# Patient Record
Sex: FEMALE | Race: WHITE | NOT HISPANIC OR LATINO | Employment: UNEMPLOYED | URBAN - METROPOLITAN AREA
[De-identification: names, ages, dates, MRNs, and addresses within clinical notes are randomized per-mention and may not be internally consistent; named-entity substitution may affect disease eponyms.]

---

## 2017-07-19 ENCOUNTER — DOCUMENTATION (OUTPATIENT)
Dept: BARIATRICS/WEIGHT MGMT | Facility: CLINIC | Age: 37
End: 2017-07-19

## 2017-07-19 DIAGNOSIS — R53.83 FATIGUE, UNSPECIFIED TYPE: ICD-10-CM

## 2017-07-19 DIAGNOSIS — R06.00 DYSPNEA, UNSPECIFIED TYPE: Primary | ICD-10-CM

## 2017-07-19 DIAGNOSIS — R10.13 DYSPEPSIA: ICD-10-CM

## 2017-07-19 RX ORDER — TRAZODONE HYDROCHLORIDE 50 MG/1
50 TABLET ORAL NIGHTLY
COMMUNITY
End: 2021-09-24

## 2017-07-19 RX ORDER — DULOXETIN HYDROCHLORIDE 30 MG/1
90 CAPSULE, DELAYED RELEASE ORAL DAILY
COMMUNITY
End: 2021-09-24

## 2017-07-19 RX ORDER — ZOLPIDEM TARTRATE 10 MG/1
10 TABLET ORAL NIGHTLY
COMMUNITY

## 2017-07-19 RX ORDER — ESCITALOPRAM OXALATE 20 MG/1
20 TABLET ORAL DAILY
COMMUNITY

## 2017-07-19 RX ORDER — GABAPENTIN 600 MG/1
600 TABLET ORAL 3 TIMES DAILY
COMMUNITY

## 2017-07-19 RX ORDER — OMEPRAZOLE 20 MG/1
20 CAPSULE, DELAYED RELEASE ORAL DAILY
COMMUNITY
End: 2017-12-18

## 2017-07-19 RX ORDER — OLANZAPINE 7.5 MG/1
7.5 TABLET ORAL NIGHTLY
COMMUNITY

## 2017-08-22 DIAGNOSIS — R06.00 DYSPNEA, UNSPECIFIED TYPE: ICD-10-CM

## 2017-08-22 DIAGNOSIS — R10.13 DYSPEPSIA: ICD-10-CM

## 2017-08-22 DIAGNOSIS — R53.83 FATIGUE, UNSPECIFIED TYPE: ICD-10-CM

## 2017-08-23 ENCOUNTER — OFFICE VISIT (OUTPATIENT)
Dept: BARIATRICS/WEIGHT MGMT | Facility: CLINIC | Age: 37
End: 2017-08-23

## 2017-08-23 VITALS
BODY MASS INDEX: 43.07 KG/M2 | DIASTOLIC BLOOD PRESSURE: 92 MMHG | SYSTOLIC BLOOD PRESSURE: 133 MMHG | WEIGHT: 258.5 LBS | TEMPERATURE: 97.8 F | HEART RATE: 82 BPM | RESPIRATION RATE: 18 BRPM | OXYGEN SATURATION: 99 % | HEIGHT: 65 IN

## 2017-08-23 DIAGNOSIS — K21.9 GASTROESOPHAGEAL REFLUX DISEASE, ESOPHAGITIS PRESENCE NOT SPECIFIED: ICD-10-CM

## 2017-08-23 DIAGNOSIS — M54.9 BACK PAIN, UNSPECIFIED BACK LOCATION, UNSPECIFIED BACK PAIN LATERALITY, UNSPECIFIED CHRONICITY: ICD-10-CM

## 2017-08-23 DIAGNOSIS — R53.83 FATIGUE, UNSPECIFIED TYPE: ICD-10-CM

## 2017-08-23 DIAGNOSIS — E66.01 OBESITY, CLASS III, BMI 40-49.9 (MORBID OBESITY) (HCC): Primary | ICD-10-CM

## 2017-08-23 PROCEDURE — 99406 BEHAV CHNG SMOKING 3-10 MIN: CPT | Performed by: SURGERY

## 2017-08-23 PROCEDURE — 99205 OFFICE O/P NEW HI 60 MIN: CPT | Performed by: SURGERY

## 2017-08-23 NOTE — PROGRESS NOTES
"[facility] BARIATRIC SURGERY  [address]  [facility] [address]  [phone]      Patient  Name:  Hao Platt.  :  1980      Date of Visit: 2017      Chief Complaint:  weight gain; unable to maintain weight loss    History of Present Illness:  Hao Platt is a 37 y.o. female who presents today for evaluation, education and consultation regarding weight loss surgery. The patient is interested in sleeve gastrectomy     Hao has been overweight for at least 7 years, has been 35 pounds or more overweight for at least 7 years, has been 100 pounds or more overweight for 1 or more years and started dieting at age 30.  Hao describes her eating habits as emotional eater, skipping meals, eating sweets at night.     Previous diet attempts include: [facility], High Protein, Low Carbohydrate and Calorie Counting; Nutri-System, Overeaters Anonymous, Diet Center and Weight Watchers; None.  The most weight Hao lost was 20 pounds on medication but was only able to maintain that weight loss for 1 yr.  Her maximum lifetime weight is 257 pounds.    She has watched the online seminar.  Her friend who is a nurse, named Hao, had a sleeve with Dr. Cooley and is doing great.      She gained a lot of weight with her last pregnancy.  Her baby is 6 months old.    She attributes weight gain also to her psych meds, particularly Zyprexa.        Past Medical History:   Diagnosis Date   • Anxiety    • Asthma     as a child   • Back pain    • Bipolar disorder    • Depression    • Fatigue    • GERD (gastroesophageal reflux disease)     feels as epigastric pain/chest pain if not taking PPI   • Hiatal hernia     on PPI.  Quit PPI, and had major epigastric pain, so back on PPI   • Hip pain     left hip \"locks up\" sometimes   • Insomnia    •  (normal spontaneous vaginal delivery)     x 3, no issues   • Peripheral edema    • Rectal bleeding     had C-scope.  from hemorrhoids   • " "Snoring     never tested for sleep apnea but thinks she might have it     Past Surgical History:   Procedure Laterality Date   • COLONOSCOPY  2015    \"conscious sedation does not work on me\" She thinks they just used Versed.  first attempt unable to complete.  Done for abdominal pain and constipation.   • ENDOSCOPY  2010    [facility]   • LAPAROSCOPIC APPENDECTOMY  2013   • TUBAL ABDOMINAL LIGATION  2017       Allergies   Allergen Reactions   • Nuts Swelling     Tree nuts         Current Outpatient Prescriptions:   •  DULoxetine (CYMBALTA) 30 MG capsule, Take 30 mg by mouth Daily., Disp: , Rfl:   •  escitalopram (LEXAPRO) 20 MG tablet, Take 20 mg by mouth Daily., Disp: , Rfl:   •  gabapentin (NEURONTIN) 600 MG tablet, Take 600 mg by mouth 3 (Three) Times a Day., Disp: , Rfl:   •  naltrexone-bupropion ER (CONTRAVE) 8-90 MG tablet, Take 1 tablet by mouth 2 (Two) Times a Day., Disp: , Rfl:   •  OLANZapine (zyPREXA) 7.5 MG tablet, Take 7.5 mg by mouth Every Night., Disp: , Rfl:   •  omeprazole (priLOSEC) 20 MG capsule, Take 20 mg by mouth Daily., Disp: , Rfl:   •  traZODone (DESYREL) 50 MG tablet, Take 50 mg by mouth Every Night., Disp: , Rfl:   •  zolpidem (AMBIEN) 10 MG tablet, Take 10 mg by mouth At Night As Needed for Sleep., Disp: , Rfl:     Social History     Social History   • Marital status:      Spouse name: Hector Platt   • Number of children: 3   • Years of education: College     Occupational History   • Homemaker      Social History Main Topics   • Smoking status: Former Smoker     Years: 25.00     Types: Cigarettes     Quit date: 2016   • Smokeless tobacco: Never Used      Comment: Is not around secondhand smoke in the house or car   • Alcohol use Yes      Comment: infrequent glass of wine   • Drug use: No   • Sexual activity: Not on file     Other Topics Concern   • Not on file     Social History Narrative    Homemaker.  Lives with  and three children.      Family History "   Problem Relation Age of Onset   • Hypertension Mother    • Hypertension Maternal Grandmother    • Hypertension Maternal Grandfather    • Hypertension Paternal Grandmother          Review of Systems:  Constitutional:  The patient reports fatigue, weight gain and denies fevers and chills.  Cardiovascular:  The patient reports edema and denies HTN, HLD, CP, MI, heart disease and DVT.  Respiratory:  The patient reports asthma and denies apnea and PE.  Gastrointestinal:  The patient reports heartburn and denies pancreatitis, liver disease and IBS.  Genitourinary:  The patient denies renal insufficiency.    Musculoskeletal:  The patient reports joint pain and denies fibromyalgia, arthritis and autoimmune disease.  Neurological:  The patient reports none and denies seizure and stroke.  Psychiatric:  The patient reports anxiety, depression, bipolar disorder and denies schizophrenia.  Endocrine:  The patient reports none and denies diabetes, thyroid disease and gout.  Hematologic:  The patient reports none and denies anemia and bleeding disorder.  Skin:  The patient denies MRSA.    Physical Exam:  Vital Signs:  Weight: 258 lb 8 oz (117 kg)   Body mass index is 43.02 kg/(m^2).  Temp: 97.8 °F (36.6 °C)   Heart Rate: 82   BP: 133/92     Physical Exam   Constitutional: She is oriented to person, place, and time. She appears well-developed and well-nourished. No distress.   HENT:   Head: Normocephalic and atraumatic.   Mouth/Throat: No oropharyngeal exudate.   Eyes: EOM are normal. Pupils are equal, round, and reactive to light. No scleral icterus.   Neck: Normal range of motion. Neck supple. No thyromegaly present.   Cardiovascular: Normal rate, regular rhythm and normal heart sounds.    No murmur heard.  Pulmonary/Chest: Effort normal. No respiratory distress. She has no wheezes.   Abdominal: Soft. She exhibits no distension and no mass. There is no tenderness. No hernia.       Musculoskeletal: Normal range of motion. She  exhibits no edema or deformity.   Neurological: She is alert and oriented to person, place, and time. No cranial nerve deficit.   Skin: Skin is warm and dry. No rash noted. No erythema.   Psychiatric: She has a normal mood and affect. Her behavior is normal. Judgment and thought content normal.        There is no problem list on file for this patient.      Assessment:    Hao Platt is a 37 y.o. year old female with medically complicated obesity pursuing sleeve gastrectomy.    Weight loss surgery is deemed medically necessary given the following obesity related comorbidities including GERD, edema, depression and mental health disease with current Weight: 258 lb 8 oz (117 kg) and Body mass index is 43.02 kg/(m^2)..    Plan:  The consultation plan and program requirements were reviewed with the patient.  The patient has been advised that a letter of medical support must be obtained from her primary care physician or referring provider. A psychological evaluation will be arranged.  A nutritional evaluation will be performed.  The patient was advised to start a high protein and low carbohydrate diet.  Necessary lifestyle modifications were discussed.  Instructions on how to access [facility] was given to the patient.  [facility] is an internet based educational video that explains the surgical procedure chosen and answers basic questions regarding that procedure.     Preoperative testing will include: CBC, CMP, Fasting Lipids, TSH, H.Pylori, Pulmonary Function Testing, CXR, EKG and EGD     Additional preop clearances required prior to surgery: None.      Patient understands that bariatric surgery is not cosmetic surgery but rather a tool to help make a lifelong commitment to lifestyle changes including diet, exercise, behavior modifications, and healthy habits.    I spent 3-10 minutes discussion smoking cessation and/or avoidance of second-hand smoke.      I spent 60 minutes with the patient and over half the time was spent  counseling.        Rita Barnhart MD

## 2017-08-24 ENCOUNTER — DOCUMENTATION (OUTPATIENT)
Dept: BARIATRICS/WEIGHT MGMT | Facility: CLINIC | Age: 37
End: 2017-08-24

## 2017-08-24 NOTE — PROGRESS NOTES
"Weight Loss Surgery  Presurgical Nutrition Assessment     Hao Platt  2017  [id number]  [id number]  1980  female    Surgery desired: Sleeve Gastrectomy    Ht 65\" (165.1 cm); Wt 258.5 # (117  Kg); BMI 43  Past Medical History:   Diagnosis Date   • Anxiety    • Asthma     as a child   • Back pain    • Bipolar disorder    • Depression    • Fatigue    • GERD (gastroesophageal reflux disease)     feels as epigastric pain/chest pain if not taking PPI   • Hiatal hernia     on PPI.  Quit PPI, and had major epigastric pain, so back on PPI   • Hip pain     left hip \"locks up\" sometimes   • Insomnia    •  (normal spontaneous vaginal delivery)     x 3, no issues   • Peripheral edema    • Rectal bleeding     had C-scope.  from hemorrhoids   • Snoring     never tested for sleep apnea but thinks she might have it     Past Surgical History:   Procedure Laterality Date   • COLONOSCOPY      \"conscious sedation does not work on me\" She thinks they just used Versed.  first attempt unable to complete.  Done for abdominal pain and constipation.   • ENDOSCOPY      [facility]   • LAPAROSCOPIC APPENDECTOMY     • TUBAL ABDOMINAL LIGATION       Allergies   Allergen Reactions   • Nuts Swelling     Tree nuts       Current Outpatient Prescriptions:   •  DULoxetine (CYMBALTA) 30 MG capsule, Take 30 mg by mouth Daily., Disp: , Rfl:   •  escitalopram (LEXAPRO) 20 MG tablet, Take 20 mg by mouth Daily., Disp: , Rfl:   •  gabapentin (NEURONTIN) 600 MG tablet, Take 600 mg by mouth 3 (Three) Times a Day., Disp: , Rfl:   •  naltrexone-bupropion ER (CONTRAVE) 8-90 MG tablet, Take 1 tablet by mouth 2 (Two) Times a Day., Disp: , Rfl:   •  OLANZapine (zyPREXA) 7.5 MG tablet, Take 7.5 mg by mouth Every Night., Disp: , Rfl:   •  omeprazole (priLOSEC) 20 MG capsule, Take 20 mg by mouth Daily., Disp: , Rfl:   •  traZODone (DESYREL) 50 MG tablet, Take 50 mg by mouth Every Night., Disp: , Rfl:   •  zolpidem (AMBIEN) 10 MG " tablet, Take 10 mg by mouth At Night As Needed for Sleep., Disp: , Rfl:       Nutrition Assessment    Estimated energy needs:  1850 kcal    Estimated calories for weight loss:  1350 kcal    IBW (Pounds):  138 #        Excess body weight (Pounds):  120#       Nutrition Recall  24 Hour recall: (B) (L) (D) -  Reviewed and discussed with patient.  Ot ate only 1 balanced meal the day she kept food record.  Prepares a fruit & veggie smoothie that is enough for 2 servings each day.  No fried foods, but diet included side of 1/2 cup mac & cheese; 2 slices peanut butter pie for dessert.                                                                               Exercise  daily - walks 45 min qd  But back & feet hurt when she does this       Education    Provided manual:  Sleeve Gastrectomy; .    Recommend that team proceed with surgery and follow per protocol.      Nutrition Goals   Dietary Guidelines per info packet, as described above.    Protein goal:  grams per day   -140 grams qd      Exercise Goals  Continue current exercise routine   Add 15-30 minutes of activity per day as tolerated      Rachelle Josue RD  08/24/2017  2:24 PM

## 2017-08-28 LAB
ALBUMIN SERPL-MCNC: 4.4 G/DL (ref 3.2–4.8)
ALBUMIN/GLOB SERPL: 1.6 G/DL (ref 1.5–2.5)
ALP SERPL-CCNC: 101 U/L (ref 25–100)
ALT SERPL-CCNC: 25 U/L (ref 7–40)
AST SERPL-CCNC: 24 U/L (ref 0–33)
BILIRUB SERPL-MCNC: 0.4 MG/DL (ref 0.3–1.2)
BUN SERPL-MCNC: 11 MG/DL (ref 9–23)
BUN/CREAT SERPL: 12.2 (ref 7–25)
CALCIUM SERPL-MCNC: 9.6 MG/DL (ref 8.7–10.4)
CHLORIDE SERPL-SCNC: 103 MMOL/L (ref 99–109)
CHOLEST SERPL-MCNC: 219 MG/DL (ref 0–200)
CO2 SERPL-SCNC: 26 MMOL/L (ref 20–31)
CREAT SERPL-MCNC: 0.9 MG/DL (ref 0.6–1.3)
ERYTHROCYTE [DISTWIDTH] IN BLOOD BY AUTOMATED COUNT: 13.3 % (ref 11.3–14.5)
GLOBULIN SER CALC-MCNC: 2.8 GM/DL
GLUCOSE SERPL-MCNC: 114 MG/DL (ref 70–100)
H PYLORI IGA SER-ACNC: <9 UNITS (ref 0–8.9)
H PYLORI IGG SER IA-ACNC: <0.9 U/ML (ref 0–0.8)
H PYLORI IGM SER-ACNC: <9 UNITS (ref 0–8.9)
HCT VFR BLD AUTO: 40.8 % (ref 34.5–44)
HDLC SERPL-MCNC: 41 MG/DL (ref 40–60)
HGB BLD-MCNC: 13.2 G/DL (ref 11.5–15.5)
LDLC SERPL CALC-MCNC: 153 MG/DL (ref 0–100)
MCH RBC QN AUTO: 27.5 PG (ref 27–31)
MCHC RBC AUTO-ENTMCNC: 32.4 G/DL (ref 32–36)
MCV RBC AUTO: 85 FL (ref 80–99)
PLATELET # BLD AUTO: 103 10*3/MM3 (ref 150–450)
POTASSIUM SERPL-SCNC: 4.5 MMOL/L (ref 3.5–5.5)
PROT SERPL-MCNC: 7.2 G/DL (ref 5.7–8.2)
RBC # BLD AUTO: 4.8 10*6/MM3 (ref 3.89–5.14)
SODIUM SERPL-SCNC: 139 MMOL/L (ref 132–146)
TRIGL SERPL-MCNC: 124 MG/DL (ref 0–150)
TSH SERPL DL<=0.005 MIU/L-ACNC: 2.53 MIU/ML (ref 0.35–5.35)
VLDLC SERPL CALC-MCNC: 24.8 MG/DL
WBC # BLD AUTO: 7.31 10*3/MM3 (ref 3.5–10.8)

## 2017-09-10 ENCOUNTER — APPOINTMENT (OUTPATIENT)
Dept: PREADMISSION TESTING | Facility: HOSPITAL | Age: 37
End: 2017-09-10

## 2017-09-10 LAB
HCT VFR BLD AUTO: 38.8 % (ref 34.5–44)
POTASSIUM BLD-SCNC: 3.9 MMOL/L (ref 3.5–5.5)

## 2017-09-10 PROCEDURE — 84132 ASSAY OF SERUM POTASSIUM: CPT | Performed by: SURGERY

## 2017-09-10 PROCEDURE — 36415 COLL VENOUS BLD VENIPUNCTURE: CPT

## 2017-09-10 PROCEDURE — 85014 HEMATOCRIT: CPT | Performed by: SURGERY

## 2017-09-11 ENCOUNTER — LAB REQUISITION (OUTPATIENT)
Dept: LAB | Facility: HOSPITAL | Age: 37
End: 2017-09-11

## 2017-09-11 ENCOUNTER — OUTSIDE FACILITY SERVICE (OUTPATIENT)
Dept: BARIATRICS/WEIGHT MGMT | Facility: CLINIC | Age: 37
End: 2017-09-11

## 2017-09-11 DIAGNOSIS — K21.9 GASTRO-ESOPHAGEAL REFLUX DISEASE WITHOUT ESOPHAGITIS: ICD-10-CM

## 2017-09-11 PROCEDURE — 43239 EGD BIOPSY SINGLE/MULTIPLE: CPT | Performed by: SURGERY

## 2017-09-11 PROCEDURE — 88305 TISSUE EXAM BY PATHOLOGIST: CPT | Performed by: SURGERY

## 2017-09-12 LAB
CYTO UR: NORMAL
LAB AP CASE REPORT: NORMAL
LAB AP CLINICAL INFORMATION: NORMAL
Lab: NORMAL
PATH REPORT.FINAL DX SPEC: NORMAL
PATH REPORT.GROSS SPEC: NORMAL

## 2017-11-21 DIAGNOSIS — R06.00 DYSPNEA, UNSPECIFIED TYPE: ICD-10-CM

## 2017-11-21 DIAGNOSIS — R53.83 FATIGUE, UNSPECIFIED TYPE: Primary | ICD-10-CM

## 2017-11-21 DIAGNOSIS — R53.83 FATIGUE, UNSPECIFIED TYPE: ICD-10-CM

## 2017-11-21 PROCEDURE — 93000 ELECTROCARDIOGRAM COMPLETE: CPT | Performed by: PHYSICIAN ASSISTANT

## 2017-12-06 ENCOUNTER — CONSULT (OUTPATIENT)
Dept: BARIATRICS/WEIGHT MGMT | Facility: CLINIC | Age: 37
End: 2017-12-06

## 2017-12-06 VITALS
DIASTOLIC BLOOD PRESSURE: 97 MMHG | OXYGEN SATURATION: 99 % | WEIGHT: 267 LBS | HEIGHT: 65 IN | HEART RATE: 83 BPM | BODY MASS INDEX: 44.48 KG/M2 | TEMPERATURE: 97.9 F | SYSTOLIC BLOOD PRESSURE: 133 MMHG | RESPIRATION RATE: 18 BRPM

## 2017-12-06 DIAGNOSIS — E66.01 OBESITY, CLASS III, BMI 40-49.9 (MORBID OBESITY) (HCC): ICD-10-CM

## 2017-12-06 DIAGNOSIS — R53.83 FATIGUE, UNSPECIFIED TYPE: Primary | ICD-10-CM

## 2017-12-06 DIAGNOSIS — K21.9 GASTROESOPHAGEAL REFLUX DISEASE, ESOPHAGITIS PRESENCE NOT SPECIFIED: ICD-10-CM

## 2017-12-06 DIAGNOSIS — R10.13 DYSPEPSIA: ICD-10-CM

## 2017-12-06 DIAGNOSIS — R06.00 DYSPNEA, UNSPECIFIED TYPE: ICD-10-CM

## 2017-12-06 DIAGNOSIS — M54.9 BACK PAIN, UNSPECIFIED BACK LOCATION, UNSPECIFIED BACK PAIN LATERALITY, UNSPECIFIED CHRONICITY: ICD-10-CM

## 2017-12-06 DIAGNOSIS — M19.90 OSTEOARTHRITIS, UNSPECIFIED OSTEOARTHRITIS TYPE, UNSPECIFIED SITE: ICD-10-CM

## 2017-12-06 PROCEDURE — 99214 OFFICE O/P EST MOD 30 MIN: CPT | Performed by: SURGERY

## 2017-12-06 NOTE — PROGRESS NOTES
"[facility] BARIATRIC SURGERY  [address]  [facility] [address] [phone]      Patient  Name:  Hao Platt  :  1980      Date of Visit: 2017      Chief Complaint:  weight gain; unable to maintain weight loss    History of Present Illness:  Hao Platt is a 37 y.o. female who presents today for evaluation, education and consultation regarding weight loss surgery.     Patient has been overweight for many years, with numerous failed dietary/weight loss attempts.  She now has obesity related comorbidities of GERD, back pain, osteoarthritis, peripheral edema and as such has decided to pursue weight loss surgery.    No change in medical hx since intake.  We discussed Zyprexa use, and she and her psychiatrist decided that benefits ot mental health outweight risks of weight regain.  Patient educated as to r/b/a of continuing drug known to cause weight gain.  Has been off Contrave x 1 month.    Review of data:    LJ: ambien+gabapentin  CBC: n/l  CMP: n/l  EGD: PQ, GE junction 38 cm, hx of hiatal hernia on previous endoscopy, but none seen on EGD.  DE reflux, gastritis  HP negative  Cardiac clearance:n/a  Echo: n/a  Stress test: n/a  PFTs: normal  Pulm clearance: n/a  EKG: NSR  CXR: n/l      Last tobacco: 9 months ago  Last NSAIDs: n/a  Last ASA: n/a  Last steroids: n/a  Last hormones: n/a      Past Medical History:   Diagnosis Date   • Anxiety    • Asthma     as a child   • Back pain    • Bipolar disorder    • Depression    • Fatigue    • GERD (gastroesophageal reflux disease)     feels as epigastric pain/chest pain if not taking PPI   • Hiatal hernia     on PPI.  Quit PPI, and had major epigastric pain, so back on PPI   • Hip pain     left hip \"locks up\" sometimes   • Insomnia    •  (normal spontaneous vaginal delivery)     x 3, no issues   • Peripheral edema    • Rectal bleeding     had C-scope.  from hemorrhoids   • Snoring     never tested for " "sleep apnea but thinks she might have it     Past Surgical History:   Procedure Laterality Date   • COLONOSCOPY  2015    \"conscious sedation does not work on me\" She thinks they just used Versed.  first attempt unable to complete.  Done for abdominal pain and constipation.   • ENDOSCOPY  2010    [facility]   • LAPAROSCOPIC APPENDECTOMY  2013   • TUBAL ABDOMINAL LIGATION  2017       Allergies   Allergen Reactions   • Nuts Swelling     Tree nuts       Current Outpatient Prescriptions:   •  DiphenhydrAMINE HCl (BENADRYL ALLERGY PO), Take  by mouth., Disp: , Rfl:   •  DULoxetine (CYMBALTA) 30 MG capsule, Take 30 mg by mouth Daily., Disp: , Rfl:   •  escitalopram (LEXAPRO) 20 MG tablet, Take 20 mg by mouth Daily., Disp: , Rfl:   •  fluticasone (VERAMYST) 27.5 MCG/SPRAY nasal spray, 2 sprays into each nostril Daily., Disp: , Rfl:   •  gabapentin (NEURONTIN) 600 MG tablet, Take 600 mg by mouth 3 (Three) Times a Day., Disp: , Rfl:   •  OLANZapine (zyPREXA) 7.5 MG tablet, Take 7.5 mg by mouth Every Night., Disp: , Rfl:   •  omeprazole (priLOSEC) 20 MG capsule, Take 20 mg by mouth Daily., Disp: , Rfl:   •  traZODone (DESYREL) 50 MG tablet, Take 50 mg by mouth Every Night., Disp: , Rfl:   •  zolpidem (AMBIEN) 10 MG tablet, Take 10 mg by mouth At Night As Needed for Sleep., Disp: , Rfl:     Social History     Social History   • Marital status:      Spouse name: Hector Platt   • Number of children: 3   • Years of education: College     Occupational History   • Homemaker      Social History Main Topics   • Smoking status: Former Smoker     Years: 25.00     Types: Cigarettes     Quit date: 2016   • Smokeless tobacco: Never Used      Comment: Is not around secondhand smoke in the house or car   • Alcohol use Yes      Comment: infrequent glass of wine   • Drug use: No   • Sexual activity: Not on file     Other Topics Concern   • Not on file     Social History 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      Family History   Problem Relation Age of Onset   • Hypertension Mother    • Hypertension Maternal Grandmother    • Hypertension Maternal Grandfather    • Hypertension Paternal Grandmother        Review of Systems:  Constitutional:  The patient reports fatigue, weight gain and denies fevers and chills.  Cardiovascular:  The patient reports edema and denies HTN, HLD, CP, MI, heart disease and DVT.  Respiratory:  The patient reports asthma and denies apnea and PE.  Gastrointestinal:  The patient reports heartburn and denies pancreatitis, liver disease and IBS.  Genitourinary:  The patient denies renal insufficiency.    Musculoskeletal:  The patient reports joint pain and denies fibromyalgia, arthritis and autoimmune disease.  Neurological:  The patient reports none and denies seizure and stroke.  Psychiatric:  The patient reports anxiety, depression, bipolar disorder   Endocrine:  The patient reports none and denies diabetes, thyroid disease and gout.  Hematologic:  The patient reports none and denies anemia and bleeding disorder.  Skin:  The patient denies MRSA.    Physical Exam:  Vital Signs:  Weight: 121 kg (267 lb)   Body mass index is 44.43 kg/(m^2).  Temp: 97.9 °F (36.6 °C)   Heart Rate: 83   BP: 133/97     Physical Exam   Constitutional: She is oriented to person, place, and time. She appears well-developed and well-nourished. No distress.   HENT:   Head: Normocephalic and atraumatic.   Mouth/Throat: No oropharyngeal exudate.   Eyes: Conjunctivae and EOM are normal. Pupils are equal, round, and reactive to light.   Neck: Normal range of motion. Neck supple. No tracheal deviation present. No thyromegaly present.   Cardiovascular: Normal rate, regular rhythm and normal heart sounds.    Pulmonary/Chest: Effort normal and breath sounds normal. No respiratory distress.   Abdominal: Soft. She exhibits no distension. There is no tenderness.       Musculoskeletal: Normal range of motion. She exhibits no  edema or deformity.   Neurological: She is alert and oriented to person, place, and time. No cranial nerve deficit.   Skin: Skin is warm and dry. She is not diaphoretic. No erythema.   Psychiatric: She has a normal mood and affect. Her behavior is normal. Judgment and thought content normal.       Patient Active Problem List   Diagnosis   • Snoring   • Rectal bleeding   • Peripheral edema   •  (normal spontaneous vaginal delivery)   • Insomnia   • Hip pain   • Hiatal hernia   • GERD (gastroesophageal reflux disease)   • Fatigue   • Depression   • Bipolar disorder   • Back pain   • Asthma   • Anxiety       Assessment:    Hao Platt is a 37 y.o. year old female with medically complicated obesity.    Weight loss surgery is deemed medically necessary given the following obesity related comorbidities including osteoarthritis, back pain, GERD and edema with current Weight: 121 kg (267 lb) and Body mass index is 44.43 kg/(m^2)..    Patient is aware that surgery is a tool, and that weight loss is not guaranteed but only seen in the context of appropriate use, follow up and exercise.    The patient was present for an approximately a 2.5 hour discussion of the purpose of weight loss surgery, how WLS is a tool to assist in achieving weight loss goals, the most common complications and how best to avoid them, and the strategies for short and long term weight loss.  Ample opportunity to discuss questions was available both in group and during the time of individual examination.    I reviewed all available preop labs, Xrays, tests, clearances, etc and signed off on these in the record.  All of this in addition to the patient's unique history and exam has been taken into consideration in determining their appropriate candidacy for weight loss surgery.    Complications  of laparoscopic/possible robotic gastric sleeve were discussed. The patient is well aware of the potential complications of surgery that include but not  "limited to bleeding, infections, deep venous thrombosis, pulmonary embolism, pulmonary complications such as pneumonia, cardiac events, hernias, small bowel obstruction, damage to the spleen or other organs, bowel injury, disfiguring scars, failure to lose weight, need for additional surgery, conversion to an open procedure, and death. Patient is also aware of complications which apply in this particular procedure that can include but are not limited to a \"leak\" at the staple line which in some instances may require conversion to gastric bypass.    The patient is aware if a hiatal hernia is encountered, it likely will be repaired.  R/B/A Rx to hiatal hernia repair were discussed as outlined in our long consent form.  Briefly risks in addition to those for LSG include recurrent hernia, FAINA, dysphagia, esophageal injury, pneumothorax, injury to the vagus nerves, injury to the thoracic duct, aorta or vena cava.    Greater than 3 minutes was spent with the patient discussing avoiding all tobacco products and second hand smoke at least 2 weeks pre-operatively and 6 weeks post-operatively to minimize the risk of sleeve leak.  This included discussing the importance of avoiding even secondhand smoke as the risk of leak is increased.  Examples discussed:  I made it very clear that the patient understands they should avoid even riding in a car where someone has previously smoked in the last 2 weeks, living in a house where someone smokes (even if it's in a separate room/patio/attached garage, etc.) we discussed that they should not have a conversation with a group of people who are smoking even if it's outside.  They can be around wood burning fires and barbecue.  I told them I do not know if marijuana has a same effects but my overall recommendation is to avoid it for 2 weeks prior in 6 weeks after surgery.  They also are aware that nicotine may also increase the risk of leak and I strongly encouraged him to avoid that as " well for 2 weeks prior in 6 weeks after surgery.    Discussed the risks, benefits and alternative therapies at great length as outlined in our extensive consent forms, consent videos, and educational teaching process under the direction of the center's .    A copy of the patient's signed informed consent is on file.    Plan:  Laparoscopic sleeve gastrectomy         Rita Barnhart MD

## 2017-12-08 PROBLEM — E66.01 OBESITY, CLASS III, BMI 40-49.9 (MORBID OBESITY) (HCC): Status: ACTIVE | Noted: 2017-12-08

## 2017-12-08 PROBLEM — E66.813 OBESITY, CLASS III, BMI 40-49.9 (MORBID OBESITY): Status: ACTIVE | Noted: 2017-12-08

## 2017-12-08 RX ORDER — SODIUM CHLORIDE 9 MG/ML
150 INJECTION, SOLUTION INTRAVENOUS CONTINUOUS
Status: CANCELLED | OUTPATIENT
Start: 2017-12-08

## 2017-12-08 NOTE — H&P
"[facility] BARIATRIC SURGERY  [address]  [facility] [address] [phone]      Patient  Name:  Hao Platt  :  1980      Date of Visit: 2017      Chief Complaint:  weight gain; unable to maintain weight loss    History of Present Illness:  Hao Platt is a 37 y.o. female who presents today for evaluation, education and consultation regarding weight loss surgery.     Patient has been overweight for many years, with numerous failed dietary/weight loss attempts.  She now has obesity related comorbidities of GERD, back pain, osteoarthritis, peripheral edema and as such has decided to pursue weight loss surgery.    No change in medical hx since intake.  We discussed Zyprexa use, and she and her psychiatrist decided that benefits ot mental health outweight risks of weight regain.  Patient educated as to r/b/a of continuing drug known to cause weight gain.  Has been off Contrave x 1 month.    Review of data:    LJ: ambien+gabapentin  CBC: n/l  CMP: n/l  EGD: PQ, GE junction 38 cm, hx of hiatal hernia on previous endoscopy, but none seen on EGD.  DE reflux, gastritis  HP negative  Cardiac clearance:n/a  Echo: n/a  Stress test: n/a  PFTs: normal  Pulm clearance: n/a  EKG: NSR  CXR: n/l      Last tobacco: 9 months ago  Last NSAIDs: n/a  Last ASA: n/a  Last steroids: n/a  Last hormones: n/a      Past Medical History:   Diagnosis Date   • Anxiety    • Asthma     as a child   • Back pain    • Bipolar disorder    • Depression    • Fatigue    • GERD (gastroesophageal reflux disease)     feels as epigastric pain/chest pain if not taking PPI   • Hiatal hernia     on PPI.  Quit PPI, and had major epigastric pain, so back on PPI   • Hip pain     left hip \"locks up\" sometimes   • Insomnia    •  (normal spontaneous vaginal delivery)     x 3, no issues   • Peripheral edema    • Rectal bleeding     had C-scope.  from hemorrhoids   • Snoring     never tested for " "sleep apnea but thinks she might have it     Past Surgical History:   Procedure Laterality Date   • COLONOSCOPY  2015    \"conscious sedation does not work on me\" She thinks they just used Versed.  first attempt unable to complete.  Done for abdominal pain and constipation.   • END[facility]COPY  2010    [facility]   • LAPAROSCOPIC APPENDECTOMY  2013   • TUBAL ABDOMINAL LIGATION  2017       Allergies   Allergen Reactions   • Nuts Swelling     Tree nuts       Current Outpatient Prescriptions:   •  DiphenhydrAMINE HCl (BENADRYL ALLERGY PO), Take  by mouth., Disp: , Rfl:   •  DULoxetine (CYMBALTA) 30 MG capsule, Take 30 mg by mouth Daily., Disp: , Rfl:   •  escitalopram (LEXAPRO) 20 MG tablet, Take 20 mg by mouth Daily., Disp: , Rfl:   •  fluticasone (VERAMYST) 27.5 MCG/SPRAY nasal spray, 2 sprays into each nostril Daily., Disp: , Rfl:   •  gabapentin (NEURONTIN) 600 MG tablet, Take 600 mg by mouth 3 (Three) Times a Day., Disp: , Rfl:   •  OLANZapine (zyPREXA) 7.5 MG tablet, Take 7.5 mg by mouth Every Night., Disp: , Rfl:   •  omeprazole (priLOSEC) 20 MG capsule, Take 20 mg by mouth Daily., Disp: , Rfl:   •  traZODone (DESYREL) 50 MG tablet, Take 50 mg by mouth Every Night., Disp: , Rfl:   •  zolpidem (AMBIEN) 10 MG tablet, Take 10 mg by mouth At Night As Needed for Sleep., Disp: , Rfl:     Social History     Social History   • Marital status:      Spouse name: Hector Platt   • Number of children: 3   • Years of education: College     Occupational History   • Homemaker      Social History Main Topics   • Smoking status: Former Smoker     Years: 25.00     Types: Cigarettes     Quit date: 2016   • Smokeless tobacco: Never Used      Comment: Is not around secondhand smoke in the house or car   • Alcohol use Yes      Comment: infrequent glass of wine   • Drug use: No   • Sexual activity: Not on file     Other Topics Concern   • Not on file     Social History 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      Family History   Problem Relation Age of Onset   • Hypertension Mother    • Hypertension Maternal Grandmother    • Hypertension Maternal Grandfather    • Hypertension Paternal Grandmother        Review of Systems:  Constitutional:  The patient reports fatigue, weight gain and denies fevers and chills.  Cardiovascular:  The patient reports edema and denies HTN, HLD, CP, MI, heart disease and DVT.  Respiratory:  The patient reports asthma and denies apnea and PE.  Gastrointestinal:  The patient reports heartburn and denies pancreatitis, liver disease and IBS.  Genitourinary:  The patient denies renal insufficiency.    Musculoskeletal:  The patient reports joint pain and denies fibromyalgia, arthritis and autoimmune disease.  Neurological:  The patient reports none and denies seizure and stroke.  Psychiatric:  The patient reports anxiety, depression, bipolar disorder   Endocrine:  The patient reports none and denies diabetes, thyroid disease and gout.  Hematologic:  The patient reports none and denies anemia and bleeding disorder.  Skin:  The patient denies MRSA.    Physical Exam:  Vital Signs:  Weight: 121 kg (267 lb)   Body mass index is 44.43 kg/(m^2).  Temp: 97.9 °F (36.6 °C)   Heart Rate: 83   BP: 133/97     Physical Exam   Constitutional: She is oriented to person, place, and time. She appears well-developed and well-nourished. No distress.   HENT:   Head: Normocephalic and atraumatic.   Mouth/Throat: No oropharyngeal exudate.   Eyes: Conjunctivae and EOM are normal. Pupils are equal, round, and reactive to light.   Neck: Normal range of motion. Neck supple. No tracheal deviation present. No thyromegaly present.   Cardiovascular: Normal rate, regular rhythm and normal heart sounds.    Pulmonary/Chest: Effort normal and breath sounds normal. No respiratory distress.   Abdominal: Soft. She exhibits no distension. There is no tenderness.       Musculoskeletal: Normal range of motion. She exhibits no  edema or deformity.   Neurological: She is alert and oriented to person, place, and time. No cranial nerve deficit.   Skin: Skin is warm and dry. She is not diaphoretic. No erythema.   Psychiatric: She has a normal mood and affect. Her behavior is normal. Judgment and thought content normal.       Patient Active Problem List   Diagnosis   • Snoring   • Rectal bleeding   • Peripheral edema   •  (normal spontaneous vaginal delivery)   • Insomnia   • Hip pain   • Hiatal hernia   • GERD (gastroesophageal reflux disease)   • Fatigue   • Depression   • Bipolar disorder   • Back pain   • Asthma   • Anxiety       Assessment:    Hao Platt is a 37 y.o. year old female with medically complicated obesity.    Weight loss surgery is deemed medically necessary given the following obesity related comorbidities including osteoarthritis, back pain, GERD and edema with current Weight: 121 kg (267 lb) and Body mass index is 44.43 kg/(m^2)..    Patient is aware that surgery is a tool, and that weight loss is not guaranteed but only seen in the context of appropriate use, follow up and exercise.    The patient was present for an approximately a 2.5 hour discussion of the purpose of weight loss surgery, how WLS is a tool to assist in achieving weight loss goals, the most common complications and how best to avoid them, and the strategies for short and long term weight loss.  Ample opportunity to discuss questions was available both in group and during the time of individual examination.    I reviewed all available preop labs, Xrays, tests, clearances, etc and signed off on these in the record.  All of this in addition to the patient's unique history and exam has been taken into consideration in determining their appropriate candidacy for weight loss surgery.    Complications  of laparoscopic/possible robotic gastric sleeve were discussed. The patient is well aware of the potential complications of surgery that include but not  "limited to bleeding, infections, deep venous thrombosis, pulmonary embolism, pulmonary complications such as pneumonia, cardiac events, hernias, small bowel obstruction, damage to the spleen or other organs, bowel injury, disfiguring scars, failure to lose weight, need for additional surgery, conversion to an open procedure, and death. Patient is also aware of complications which apply in this particular procedure that can include but are not limited to a \"leak\" at the staple line which in some instances may require conversion to gastric bypass.    The patient is aware if a hiatal hernia is encountered, it likely will be repaired.  R/B/A Rx to hiatal hernia repair were discussed as outlined in our long consent form.  Briefly risks in addition to those for LSG include recurrent hernia, FAINA, dysphagia, esophageal injury, pneumothorax, injury to the vagus nerves, injury to the thoracic duct, aorta or vena cava.    Greater than 3 minutes was spent with the patient discussing avoiding all tobacco products and second hand smoke at least 2 weeks pre-operatively and 6 weeks post-operatively to minimize the risk of sleeve leak.  This included discussing the importance of avoiding even secondhand smoke as the risk of leak is increased.  Examples discussed:  I made it very clear that the patient understands they should avoid even riding in a car where someone has previously smoked in the last 2 weeks, living in a house where someone smokes (even if it's in a separate room/patio/attached garage, etc.) we discussed that they should not have a conversation with a group of people who are smoking even if it's outside.  They can be around wood burning fires and barbecue.  I told them I do not know if marijuana has a same effects but my overall recommendation is to avoid it for 2 weeks prior in 6 weeks after surgery.  They also are aware that nicotine may also increase the risk of leak and I strongly encouraged him to avoid that as " well for 2 weeks prior in 6 weeks after surgery.    Discussed the risks, benefits and alternative therapies at great length as outlined in our extensive consent forms, consent videos, and educational teaching process under the direction of the center's .    A copy of the patient's signed informed consent is on file.    Plan:  Laparoscopic sleeve gastrectomy         Rita Barnhart MD

## 2017-12-10 ENCOUNTER — APPOINTMENT (OUTPATIENT)
Dept: PREADMISSION TESTING | Facility: HOSPITAL | Age: 37
End: 2017-12-10

## 2017-12-10 LAB
ALBUMIN SERPL-MCNC: 4.4 G/DL (ref 3.2–4.8)
ALBUMIN/GLOB SERPL: 1.7 G/DL (ref 1.5–2.5)
ALP SERPL-CCNC: 69 U/L (ref 25–100)
ALT SERPL W P-5'-P-CCNC: 36 U/L (ref 7–40)
ANION GAP SERPL CALCULATED.3IONS-SCNC: 8 MMOL/L (ref 3–11)
AST SERPL-CCNC: 26 U/L (ref 0–33)
BILIRUB SERPL-MCNC: 0.4 MG/DL (ref 0.3–1.2)
BUN BLD-MCNC: 22 MG/DL (ref 9–23)
BUN/CREAT SERPL: 31.4 (ref 7–25)
CALCIUM SPEC-SCNC: 9.1 MG/DL (ref 8.7–10.4)
CHLORIDE SERPL-SCNC: 105 MMOL/L (ref 99–109)
CO2 SERPL-SCNC: 25 MMOL/L (ref 20–31)
CREAT BLD-MCNC: 0.7 MG/DL (ref 0.6–1.3)
DEPRECATED RDW RBC AUTO: 38.8 FL (ref 37–54)
ERYTHROCYTE [DISTWIDTH] IN BLOOD BY AUTOMATED COUNT: 12.9 % (ref 11.3–14.5)
GFR SERPL CREATININE-BSD FRML MDRD: 94 ML/MIN/1.73
GLOBULIN UR ELPH-MCNC: 2.6 GM/DL
GLUCOSE BLD-MCNC: 103 MG/DL (ref 70–100)
HBA1C MFR BLD: 5.7 % (ref 4.8–5.6)
HCT VFR BLD AUTO: 41.3 % (ref 34.5–44)
HGB BLD-MCNC: 13.6 G/DL (ref 11.5–15.5)
MCH RBC QN AUTO: 27.3 PG (ref 27–31)
MCHC RBC AUTO-ENTMCNC: 32.9 G/DL (ref 32–36)
MCV RBC AUTO: 82.8 FL (ref 80–99)
PLATELET # BLD AUTO: 158 10*3/MM3 (ref 150–450)
PMV BLD AUTO: 9.2 FL (ref 6–12)
POTASSIUM BLD-SCNC: 4.1 MMOL/L (ref 3.5–5.5)
PROT SERPL-MCNC: 7 G/DL (ref 5.7–8.2)
RBC # BLD AUTO: 4.99 10*6/MM3 (ref 3.89–5.14)
SODIUM BLD-SCNC: 138 MMOL/L (ref 132–146)
WBC NRBC COR # BLD: 7.41 10*3/MM3 (ref 3.5–10.8)

## 2017-12-10 NOTE — DISCHARGE INSTRUCTIONS
The following information and instructions were given:    NPO after MN except sips of water with routine prescribed medication (except blood thinner, diabetes, or weight reducing medication) unless otherwise instructed by your physician.  Do not eat, drink, smoke or chew gum after MN the night before surgery. This also includes no mints.    DO NOT shave for two days before your procedure.  Do not wear makeup.      DO NOT wear fingernail polish (gel/regular) and/or acrylic/artificial nails on the day of surgery.   If a patient had recent manicure and would rather not remove polish or artificial nails, then the minimum requirement is that the polish/artificial nails must be removed from the middle finger on each hand.      If patient was having surgery on an upper extremity, then the patient was instructed that fingernail polish/artificial fingernails must be removed for surgery.  NO EXCEPTIONS.      If patient was having surgery on a lower extremity, then the patient was instructed that toenail polish on both extremities must be removed for surgery.  NO EXCEPTIONS.    Remove all jewelry (advised to go to jeweler if unable to remove).  Jewelry especially rings can no longer be taped for surgery.    Leave anything you consider valuable at home.    Leave your suitcase in the car until after your surgery.    Bring the following with you (if applicable)   -picture ID and insurance cards   -Co-pay/deductible required by insurance   -Medications in the original bottles (not a list) including all over-the-counter  medications if not brought to PAT   -Copy of advance directive, living will or power of  documents if not  brought to PAT   -CPAP or BIPAP mask and tubing (do not bring machine)   -Skin prep instructions sheet   -PAT Pass    Education booklet, brochure, handout or binder given to patient.    Pain Control After Surgery handout given to patient.    Respirex use (handout given to patient) and pneumonia  prevention.    Signs and Symptoms of infection.    DVT Prevention stressing the importance of ambulation.    Patient to apply Chlorhexadine wipes to surgical area (as instructed) the night before procedure and the AM of procedure.    When applicable for ERAS patients (colon, orthropedic), patients were instructed to drink 20 ounces of Gatorade or G2 for diabetics (or until full) the morning of surgery.  The Gatorade or G2 must be consumed at least 3 hours before surgery start time.  No RED Gatorade or G2.  Appropriated ERAS handout given to patient during PAT visit.    Patient instructed to remove all jewelry for procedure.  Patient was instructed that if unable to remove jewelry especially rings to request assistance from a jeweler. Explained that if the piece of jewelry could not be removed before arrival to preop that it will be cut off.  Reinforced with patient that all jewelry must be removed for safety reasons that taping a ring was not an option.  Patient verbalized understanding.

## 2017-12-12 ENCOUNTER — ANESTHESIA (OUTPATIENT)
Dept: PERIOP | Facility: HOSPITAL | Age: 37
End: 2017-12-12

## 2017-12-12 ENCOUNTER — ANESTHESIA EVENT (OUTPATIENT)
Dept: PERIOP | Facility: HOSPITAL | Age: 37
End: 2017-12-12

## 2017-12-12 ENCOUNTER — HOSPITAL ENCOUNTER (INPATIENT)
Facility: HOSPITAL | Age: 37
LOS: 1 days | Discharge: HOME | End: 2017-12-13
Attending: SURGERY | Admitting: SURGERY

## 2017-12-12 DIAGNOSIS — E66.01 OBESITY, CLASS III, BMI 40-49.9 (MORBID OBESITY) (HCC): ICD-10-CM

## 2017-12-12 LAB
B-HCG UR QL: NEGATIVE
INTERNAL NEGATIVE CONTROL: NORMAL
INTERNAL POSITIVE CONTROL: REACTIVE
Lab: NORMAL

## 2017-12-12 PROCEDURE — 88307 TISSUE EXAM BY PATHOLOGIST: CPT | Performed by: SURGERY

## 2017-12-12 PROCEDURE — 25010000002 BUPRENORPHINE PER 0.1 MG: Performed by: NURSE ANESTHETIST, CERTIFIED REGISTERED

## 2017-12-12 PROCEDURE — 25010000002 FENTANYL CITRATE (PF) 100 MCG/2ML SOLUTION: Performed by: NURSE ANESTHETIST, CERTIFIED REGISTERED

## 2017-12-12 PROCEDURE — 25010000002 PROPOFOL 1000 MG/ML EMULSION: Performed by: NURSE ANESTHETIST, CERTIFIED REGISTERED

## 2017-12-12 PROCEDURE — 25010000002 NEOSTIGMINE 10 MG/10ML SOLUTION: Performed by: NURSE ANESTHETIST, CERTIFIED REGISTERED

## 2017-12-12 PROCEDURE — 94799 UNLISTED PULMONARY SVC/PX: CPT

## 2017-12-12 PROCEDURE — 43775 LAP SLEEVE GASTRECTOMY: CPT | Performed by: SURGERY

## 2017-12-12 PROCEDURE — 25010000002 DEXAMETHASONE PER 1 MG: Performed by: NURSE ANESTHETIST, CERTIFIED REGISTERED

## 2017-12-12 PROCEDURE — 0DJ08ZZ INSPECTION OF UPPER INTESTINAL TRACT, VIA NATURAL OR ARTIFICIAL OPENING ENDOSCOPIC: ICD-10-PCS | Performed by: SURGERY

## 2017-12-12 PROCEDURE — 25010000002 HYDROMORPHONE PER 4 MG: Performed by: SURGERY

## 2017-12-12 PROCEDURE — 0DB64Z3 EXCISION OF STOMACH, PERCUTANEOUS ENDOSCOPIC APPROACH, VERTICAL: ICD-10-PCS | Performed by: SURGERY

## 2017-12-12 PROCEDURE — 25010000002 PROPOFOL 10 MG/ML EMULSION: Performed by: NURSE ANESTHETIST, CERTIFIED REGISTERED

## 2017-12-12 PROCEDURE — 25010000002 ONDANSETRON PER 1 MG: Performed by: SURGERY

## 2017-12-12 PROCEDURE — 25010000002 ONDANSETRON PER 1 MG: Performed by: NURSE ANESTHETIST, CERTIFIED REGISTERED

## 2017-12-12 PROCEDURE — 25010000003 CEFAZOLIN IN DEXTROSE 2-4 GM/100ML-% SOLUTION: Performed by: SURGERY

## 2017-12-12 PROCEDURE — 25010000002 HYDROMORPHONE PER 4 MG: Performed by: NURSE ANESTHETIST, CERTIFIED REGISTERED

## 2017-12-12 PROCEDURE — 25010000002 ENOXAPARIN PER 10 MG: Performed by: SURGERY

## 2017-12-12 DEVICE — PERI-STRIPS DRY WITH VERITAS COLLAGEN MATRIX (PSD-V) IS PREPARED FROM DEHYDRATED BOVINE PERICARDIUM PROCURED FROM CATTLE UNDER 30 MONTHS OF AGE IN THE UNITED STATES. ONE (1) TUBE OF PSD GEL (GEL) IS PROVIDED FOR EVERY TWO (2) POUCHES OF PSD-V. THE GEL IS USED TO CREATE A TEMPORARY BOND BETWEEN THE PSD-V BUTTRESS AND THE SURGICAL STAPLER JAWS UNTIL THE STAPLER IS POSITIONED AND FIRED.
Type: IMPLANTABLE DEVICE | Site: STOMACH | Status: FUNCTIONAL
Brand: PERI-STRIPS DRY WITH VERITAS COLLAGEN MATRIX

## 2017-12-12 DEVICE — SEALANT FIBRIN TISSEEL FZ 4ML: Type: IMPLANTABLE DEVICE | Site: ABDOMEN | Status: FUNCTIONAL

## 2017-12-12 RX ORDER — DULOXETIN HYDROCHLORIDE 30 MG/1
30 CAPSULE, DELAYED RELEASE ORAL DAILY
Status: DISCONTINUED | OUTPATIENT
Start: 2017-12-13 | End: 2017-12-13 | Stop reason: HOSPADM

## 2017-12-12 RX ORDER — PROMETHAZINE HYDROCHLORIDE 25 MG/ML
12.5 INJECTION, SOLUTION INTRAMUSCULAR; INTRAVENOUS ONCE AS NEEDED
Status: DISCONTINUED | OUTPATIENT
Start: 2017-12-12 | End: 2017-12-12 | Stop reason: HOSPADM

## 2017-12-12 RX ORDER — FAMOTIDINE 20 MG/1
20 TABLET, FILM COATED ORAL ONCE
Status: DISCONTINUED | OUTPATIENT
Start: 2017-12-12 | End: 2017-12-12

## 2017-12-12 RX ORDER — BUPIVACAINE HYDROCHLORIDE AND EPINEPHRINE 2.5; 5 MG/ML; UG/ML
INJECTION, SOLUTION EPIDURAL; INFILTRATION; INTRACAUDAL; PERINEURAL AS NEEDED
Status: DISCONTINUED | OUTPATIENT
Start: 2017-12-12 | End: 2017-12-12 | Stop reason: HOSPADM

## 2017-12-12 RX ORDER — SODIUM CHLORIDE, SODIUM LACTATE, POTASSIUM CHLORIDE, CALCIUM CHLORIDE 600; 310; 30; 20 MG/100ML; MG/100ML; MG/100ML; MG/100ML
150 INJECTION, SOLUTION INTRAVENOUS CONTINUOUS
Status: DISCONTINUED | OUTPATIENT
Start: 2017-12-12 | End: 2017-12-13 | Stop reason: HOSPADM

## 2017-12-12 RX ORDER — ACETAMINOPHEN 500 MG
1000 TABLET ORAL ONCE
Status: COMPLETED | OUTPATIENT
Start: 2017-12-12 | End: 2017-12-12

## 2017-12-12 RX ORDER — SODIUM CHLORIDE, SODIUM LACTATE, POTASSIUM CHLORIDE, CALCIUM CHLORIDE 600; 310; 30; 20 MG/100ML; MG/100ML; MG/100ML; MG/100ML
9 INJECTION, SOLUTION INTRAVENOUS CONTINUOUS
Status: CANCELLED | OUTPATIENT
Start: 2017-12-12

## 2017-12-12 RX ORDER — ROCURONIUM BROMIDE 10 MG/ML
INJECTION, SOLUTION INTRAVENOUS AS NEEDED
Status: DISCONTINUED | OUTPATIENT
Start: 2017-12-12 | End: 2017-12-12 | Stop reason: SURG

## 2017-12-12 RX ORDER — LORAZEPAM 2 MG/ML
0.5 INJECTION INTRAMUSCULAR EVERY 12 HOURS PRN
Status: DISCONTINUED | OUTPATIENT
Start: 2017-12-12 | End: 2017-12-13 | Stop reason: HOSPADM

## 2017-12-12 RX ORDER — CEFAZOLIN SODIUM 2 G/100ML
2 INJECTION, SOLUTION INTRAVENOUS EVERY 8 HOURS
Status: COMPLETED | OUTPATIENT
Start: 2017-12-12 | End: 2017-12-13

## 2017-12-12 RX ORDER — BUPRENORPHINE HYDROCHLORIDE 0.32 MG/ML
INJECTION INTRAMUSCULAR; INTRAVENOUS AS NEEDED
Status: DISCONTINUED | OUTPATIENT
Start: 2017-12-12 | End: 2017-12-12 | Stop reason: SURG

## 2017-12-12 RX ORDER — ONDANSETRON 4 MG/1
4 TABLET, FILM COATED ORAL EVERY 6 HOURS PRN
Status: DISCONTINUED | OUTPATIENT
Start: 2017-12-12 | End: 2017-12-13 | Stop reason: HOSPADM

## 2017-12-12 RX ORDER — PROMETHAZINE HYDROCHLORIDE 25 MG/1
25 SUPPOSITORY RECTAL ONCE AS NEEDED
Status: DISCONTINUED | OUTPATIENT
Start: 2017-12-12 | End: 2017-12-12 | Stop reason: HOSPADM

## 2017-12-12 RX ORDER — FAMOTIDINE 10 MG/ML
20 INJECTION, SOLUTION INTRAVENOUS ONCE
Status: CANCELLED | OUTPATIENT
Start: 2017-12-12 | End: 2017-12-12

## 2017-12-12 RX ORDER — CHLORHEXIDINE GLUCONATE 0.12 MG/ML
15 RINSE ORAL ONCE
Status: COMPLETED | OUTPATIENT
Start: 2017-12-12 | End: 2017-12-12

## 2017-12-12 RX ORDER — PROMETHAZINE HYDROCHLORIDE 25 MG/ML
6.25 INJECTION, SOLUTION INTRAMUSCULAR; INTRAVENOUS ONCE AS NEEDED
Status: DISCONTINUED | OUTPATIENT
Start: 2017-12-12 | End: 2017-12-12 | Stop reason: HOSPADM

## 2017-12-12 RX ORDER — HYDROMORPHONE HYDROCHLORIDE 2 MG/1
2 TABLET ORAL EVERY 4 HOURS PRN
Status: DISCONTINUED | OUTPATIENT
Start: 2017-12-12 | End: 2017-12-13 | Stop reason: HOSPADM

## 2017-12-12 RX ORDER — FENTANYL CITRATE 50 UG/ML
INJECTION, SOLUTION INTRAMUSCULAR; INTRAVENOUS AS NEEDED
Status: DISCONTINUED | OUTPATIENT
Start: 2017-12-12 | End: 2017-12-12 | Stop reason: SURG

## 2017-12-12 RX ORDER — ONDANSETRON 2 MG/ML
INJECTION INTRAMUSCULAR; INTRAVENOUS AS NEEDED
Status: DISCONTINUED | OUTPATIENT
Start: 2017-12-12 | End: 2017-12-12 | Stop reason: SURG

## 2017-12-12 RX ORDER — LIDOCAINE HYDROCHLORIDE 10 MG/ML
0.5 INJECTION, SOLUTION EPIDURAL; INFILTRATION; INTRACAUDAL; PERINEURAL ONCE AS NEEDED
Status: COMPLETED | OUTPATIENT
Start: 2017-12-12 | End: 2017-12-12

## 2017-12-12 RX ORDER — LORAZEPAM 1 MG/1
1 TABLET ORAL EVERY 12 HOURS PRN
Status: DISCONTINUED | OUTPATIENT
Start: 2017-12-12 | End: 2017-12-13 | Stop reason: HOSPADM

## 2017-12-12 RX ORDER — CEFAZOLIN SODIUM 2 G/100ML
2 INJECTION, SOLUTION INTRAVENOUS
Status: COMPLETED | OUTPATIENT
Start: 2017-12-12 | End: 2017-12-12

## 2017-12-12 RX ORDER — FENTANYL CITRATE 50 UG/ML
50 INJECTION, SOLUTION INTRAMUSCULAR; INTRAVENOUS
Status: DISCONTINUED | OUTPATIENT
Start: 2017-12-12 | End: 2017-12-12 | Stop reason: HOSPADM

## 2017-12-12 RX ORDER — SODIUM CHLORIDE AND POTASSIUM CHLORIDE 150; 450 MG/100ML; MG/100ML
125 INJECTION, SOLUTION INTRAVENOUS CONTINUOUS
Status: DISCONTINUED | OUTPATIENT
Start: 2017-12-13 | End: 2017-12-13 | Stop reason: HOSPADM

## 2017-12-12 RX ORDER — CLONIDINE HYDROCHLORIDE 0.1 MG/1
0.1 TABLET ORAL EVERY 6 HOURS PRN
Status: DISCONTINUED | OUTPATIENT
Start: 2017-12-12 | End: 2017-12-13 | Stop reason: HOSPADM

## 2017-12-12 RX ORDER — HYDROCODONE BITARTRATE AND ACETAMINOPHEN 7.5; 325 MG/1; MG/1
1 TABLET ORAL EVERY 4 HOURS PRN
Status: DISCONTINUED | OUTPATIENT
Start: 2017-12-12 | End: 2017-12-13 | Stop reason: HOSPADM

## 2017-12-12 RX ORDER — LIDOCAINE HYDROCHLORIDE 40 MG/ML
SOLUTION TOPICAL AS NEEDED
Status: DISCONTINUED | OUTPATIENT
Start: 2017-12-12 | End: 2017-12-12 | Stop reason: SURG

## 2017-12-12 RX ORDER — PANTOPRAZOLE SODIUM 40 MG/10ML
40 INJECTION, POWDER, LYOPHILIZED, FOR SOLUTION INTRAVENOUS
Status: DISCONTINUED | OUTPATIENT
Start: 2017-12-13 | End: 2017-12-13 | Stop reason: HOSPADM

## 2017-12-12 RX ORDER — ZOLPIDEM TARTRATE 5 MG/1
10 TABLET ORAL NIGHTLY
Status: DISCONTINUED | OUTPATIENT
Start: 2017-12-12 | End: 2017-12-13 | Stop reason: HOSPADM

## 2017-12-12 RX ORDER — HYDROMORPHONE HYDROCHLORIDE 1 MG/ML
0.5 INJECTION, SOLUTION INTRAMUSCULAR; INTRAVENOUS; SUBCUTANEOUS
Status: DISCONTINUED | OUTPATIENT
Start: 2017-12-12 | End: 2017-12-13 | Stop reason: HOSPADM

## 2017-12-12 RX ORDER — PROMETHAZINE HYDROCHLORIDE 25 MG/ML
12.5 INJECTION, SOLUTION INTRAMUSCULAR; INTRAVENOUS EVERY 6 HOURS PRN
Status: DISCONTINUED | OUTPATIENT
Start: 2017-12-12 | End: 2017-12-13 | Stop reason: HOSPADM

## 2017-12-12 RX ORDER — NALOXONE HCL 0.4 MG/ML
0.1 VIAL (ML) INJECTION
Status: DISCONTINUED | OUTPATIENT
Start: 2017-12-12 | End: 2017-12-13 | Stop reason: HOSPADM

## 2017-12-12 RX ORDER — SODIUM CHLORIDE 9 MG/ML
INJECTION, SOLUTION INTRAVENOUS AS NEEDED
Status: DISCONTINUED | OUTPATIENT
Start: 2017-12-12 | End: 2017-12-12 | Stop reason: HOSPADM

## 2017-12-12 RX ORDER — ESCITALOPRAM OXALATE 20 MG/1
20 TABLET ORAL DAILY
Status: DISCONTINUED | OUTPATIENT
Start: 2017-12-13 | End: 2017-12-13 | Stop reason: HOSPADM

## 2017-12-12 RX ORDER — HYDRALAZINE HYDROCHLORIDE 20 MG/ML
5 INJECTION INTRAMUSCULAR; INTRAVENOUS
Status: DISCONTINUED | OUTPATIENT
Start: 2017-12-12 | End: 2017-12-12 | Stop reason: HOSPADM

## 2017-12-12 RX ORDER — SCOLOPAMINE TRANSDERMAL SYSTEM 1 MG/1
1 PATCH, EXTENDED RELEASE TRANSDERMAL ONCE
Status: DISCONTINUED | OUTPATIENT
Start: 2017-12-12 | End: 2017-12-13

## 2017-12-12 RX ORDER — GLYCOPYRROLATE 0.2 MG/ML
INJECTION INTRAMUSCULAR; INTRAVENOUS AS NEEDED
Status: DISCONTINUED | OUTPATIENT
Start: 2017-12-12 | End: 2017-12-12 | Stop reason: SURG

## 2017-12-12 RX ORDER — PROPOFOL 10 MG/ML
VIAL (ML) INTRAVENOUS AS NEEDED
Status: DISCONTINUED | OUTPATIENT
Start: 2017-12-12 | End: 2017-12-12 | Stop reason: SURG

## 2017-12-12 RX ORDER — CYANOCOBALAMIN 1000 UG/ML
1000 INJECTION, SOLUTION INTRAMUSCULAR; SUBCUTANEOUS ONCE
Status: COMPLETED | OUTPATIENT
Start: 2017-12-13 | End: 2017-12-13

## 2017-12-12 RX ORDER — PROMETHAZINE HYDROCHLORIDE 25 MG/1
25 TABLET ORAL ONCE AS NEEDED
Status: DISCONTINUED | OUTPATIENT
Start: 2017-12-12 | End: 2017-12-12 | Stop reason: HOSPADM

## 2017-12-12 RX ORDER — ONDANSETRON 2 MG/ML
4 INJECTION INTRAMUSCULAR; INTRAVENOUS EVERY 6 HOURS PRN
Status: DISCONTINUED | OUTPATIENT
Start: 2017-12-12 | End: 2017-12-13 | Stop reason: HOSPADM

## 2017-12-12 RX ORDER — DIPHENHYDRAMINE HYDROCHLORIDE 50 MG/ML
25 INJECTION INTRAMUSCULAR; INTRAVENOUS EVERY 4 HOURS PRN
Status: DISCONTINUED | OUTPATIENT
Start: 2017-12-12 | End: 2017-12-13 | Stop reason: HOSPADM

## 2017-12-12 RX ORDER — SIMETHICONE 80 MG
80 TABLET,CHEWABLE ORAL 4 TIMES DAILY PRN
Status: DISCONTINUED | OUTPATIENT
Start: 2017-12-12 | End: 2017-12-13 | Stop reason: HOSPADM

## 2017-12-12 RX ORDER — OXYCODONE HYDROCHLORIDE AND ACETAMINOPHEN 5; 325 MG/1; MG/1
1 TABLET ORAL ONCE AS NEEDED
Status: DISCONTINUED | OUTPATIENT
Start: 2017-12-12 | End: 2017-12-12 | Stop reason: HOSPADM

## 2017-12-12 RX ORDER — SODIUM CHLORIDE, SODIUM LACTATE, POTASSIUM CHLORIDE, CALCIUM CHLORIDE 600; 310; 30; 20 MG/100ML; MG/100ML; MG/100ML; MG/100ML
INJECTION, SOLUTION INTRAVENOUS CONTINUOUS PRN
Status: DISCONTINUED | OUTPATIENT
Start: 2017-12-12 | End: 2017-12-12 | Stop reason: SURG

## 2017-12-12 RX ORDER — DEXAMETHASONE SODIUM PHOSPHATE 10 MG/ML
INJECTION INTRAMUSCULAR; INTRAVENOUS AS NEEDED
Status: DISCONTINUED | OUTPATIENT
Start: 2017-12-12 | End: 2017-12-12 | Stop reason: SURG

## 2017-12-12 RX ORDER — LABETALOL HYDROCHLORIDE 5 MG/ML
10 INJECTION, SOLUTION INTRAVENOUS
Status: DISCONTINUED | OUTPATIENT
Start: 2017-12-12 | End: 2017-12-13 | Stop reason: HOSPADM

## 2017-12-12 RX ORDER — MAGNESIUM HYDROXIDE 1200 MG/15ML
LIQUID ORAL AS NEEDED
Status: DISCONTINUED | OUTPATIENT
Start: 2017-12-12 | End: 2017-12-12 | Stop reason: HOSPADM

## 2017-12-12 RX ORDER — ESMOLOL HYDROCHLORIDE 10 MG/ML
INJECTION INTRAVENOUS AS NEEDED
Status: DISCONTINUED | OUTPATIENT
Start: 2017-12-12 | End: 2017-12-12 | Stop reason: SURG

## 2017-12-12 RX ORDER — SODIUM CHLORIDE 0.9 % (FLUSH) 0.9 %
1-10 SYRINGE (ML) INJECTION AS NEEDED
Status: DISCONTINUED | OUTPATIENT
Start: 2017-12-12 | End: 2017-12-12 | Stop reason: HOSPADM

## 2017-12-12 RX ORDER — SODIUM CHLORIDE 9 MG/ML
150 INJECTION, SOLUTION INTRAVENOUS CONTINUOUS
Status: DISCONTINUED | OUTPATIENT
Start: 2017-12-12 | End: 2017-12-13 | Stop reason: HOSPADM

## 2017-12-12 RX ORDER — BUPIVACAINE HYDROCHLORIDE 2.5 MG/ML
INJECTION, SOLUTION EPIDURAL; INFILTRATION; INTRACAUDAL AS NEEDED
Status: DISCONTINUED | OUTPATIENT
Start: 2017-12-12 | End: 2017-12-12 | Stop reason: SURG

## 2017-12-12 RX ORDER — PANTOPRAZOLE SODIUM 40 MG/10ML
40 INJECTION, POWDER, LYOPHILIZED, FOR SOLUTION INTRAVENOUS ONCE
Status: COMPLETED | OUTPATIENT
Start: 2017-12-12 | End: 2017-12-12

## 2017-12-12 RX ORDER — METOCLOPRAMIDE HYDROCHLORIDE 5 MG/ML
10 INJECTION INTRAMUSCULAR; INTRAVENOUS EVERY 6 HOURS PRN
Status: DISCONTINUED | OUTPATIENT
Start: 2017-12-12 | End: 2017-12-13 | Stop reason: HOSPADM

## 2017-12-12 RX ORDER — NEOSTIGMINE METHYLSULFATE 1 MG/ML
INJECTION, SOLUTION INTRAVENOUS AS NEEDED
Status: DISCONTINUED | OUTPATIENT
Start: 2017-12-12 | End: 2017-12-12 | Stop reason: SURG

## 2017-12-12 RX ORDER — TRAZODONE HYDROCHLORIDE 50 MG/1
50 TABLET ORAL NIGHTLY
Status: DISCONTINUED | OUTPATIENT
Start: 2017-12-12 | End: 2017-12-13 | Stop reason: HOSPADM

## 2017-12-12 RX ORDER — VECURONIUM BROMIDE 1 MG/ML
INJECTION, POWDER, LYOPHILIZED, FOR SOLUTION INTRAVENOUS AS NEEDED
Status: DISCONTINUED | OUTPATIENT
Start: 2017-12-12 | End: 2017-12-12 | Stop reason: SURG

## 2017-12-12 RX ORDER — HYDROMORPHONE HYDROCHLORIDE 1 MG/ML
0.5 INJECTION, SOLUTION INTRAMUSCULAR; INTRAVENOUS; SUBCUTANEOUS
Status: DISCONTINUED | OUTPATIENT
Start: 2017-12-12 | End: 2017-12-12 | Stop reason: HOSPADM

## 2017-12-12 RX ORDER — ONDANSETRON 2 MG/ML
4 INJECTION INTRAMUSCULAR; INTRAVENOUS ONCE AS NEEDED
Status: DISCONTINUED | OUTPATIENT
Start: 2017-12-12 | End: 2017-12-12 | Stop reason: HOSPADM

## 2017-12-12 RX ORDER — LABETALOL HYDROCHLORIDE 5 MG/ML
5 INJECTION, SOLUTION INTRAVENOUS
Status: DISCONTINUED | OUTPATIENT
Start: 2017-12-12 | End: 2017-12-12 | Stop reason: HOSPADM

## 2017-12-12 RX ADMIN — LIDOCAINE HYDROCHLORIDE 1 EACH: 40 SOLUTION TOPICAL at 13:47

## 2017-12-12 RX ADMIN — PROPOFOL 20 MCG/KG/MIN: 10 INJECTION, EMULSION INTRAVENOUS at 13:52

## 2017-12-12 RX ADMIN — ACETAMINOPHEN 1000 MG: 500 TABLET ORAL at 11:46

## 2017-12-12 RX ADMIN — CEFAZOLIN SODIUM 2 G: 2 INJECTION, SOLUTION INTRAVENOUS at 20:32

## 2017-12-12 RX ADMIN — SODIUM CHLORIDE 150 ML/HR: 9 INJECTION, SOLUTION INTRAVENOUS at 11:56

## 2017-12-12 RX ADMIN — DEXAMETHASONE SODIUM PHOSPHATE 6 MG: 10 INJECTION INTRAMUSCULAR; INTRAVENOUS at 13:54

## 2017-12-12 RX ADMIN — ESMOLOL HYDROCHLORIDE 10 MG: 10 INJECTION, SOLUTION INTRAVENOUS at 15:26

## 2017-12-12 RX ADMIN — BUPRENORPHINE HYDROCHLORIDE 0.3 MG: 0.32 INJECTION INTRAMUSCULAR; INTRAVENOUS at 13:48

## 2017-12-12 RX ADMIN — FENTANYL CITRATE 50 MCG: 50 INJECTION INTRAMUSCULAR; INTRAVENOUS at 16:30

## 2017-12-12 RX ADMIN — BUPIVACAINE HYDROCHLORIDE 60 ML: 2.5 INJECTION, SOLUTION EPIDURAL; INFILTRATION; INTRACAUDAL; PERINEURAL at 13:48

## 2017-12-12 RX ADMIN — CHLORHEXIDINE GLUCONATE 15 ML: 1.2 RINSE ORAL at 11:44

## 2017-12-12 RX ADMIN — SODIUM CHLORIDE, POTASSIUM CHLORIDE, SODIUM LACTATE AND CALCIUM CHLORIDE 150 ML/HR: 600; 310; 30; 20 INJECTION, SOLUTION INTRAVENOUS at 18:42

## 2017-12-12 RX ADMIN — HYDROMORPHONE HYDROCHLORIDE 0.5 MG: 1 INJECTION, SOLUTION INTRAMUSCULAR; INTRAVENOUS; SUBCUTANEOUS at 22:20

## 2017-12-12 RX ADMIN — FENTANYL CITRATE 50 MCG: 50 INJECTION, SOLUTION INTRAMUSCULAR; INTRAVENOUS at 13:42

## 2017-12-12 RX ADMIN — ROCURONIUM BROMIDE 50 MG: 10 INJECTION INTRAVENOUS at 13:42

## 2017-12-12 RX ADMIN — ZOLPIDEM TARTRATE 10 MG: 5 TABLET, FILM COATED ORAL at 20:32

## 2017-12-12 RX ADMIN — SODIUM CHLORIDE 1000 ML: 9 INJECTION, SOLUTION INTRAVENOUS at 11:19

## 2017-12-12 RX ADMIN — ONDANSETRON 4 MG: 2 INJECTION INTRAMUSCULAR; INTRAVENOUS at 15:21

## 2017-12-12 RX ADMIN — OLANZAPINE 7.5 MG: 2.5 TABLET, FILM COATED ORAL at 20:32

## 2017-12-12 RX ADMIN — LIDOCAINE HYDROCHLORIDE 0.2 ML: 10 INJECTION, SOLUTION EPIDURAL; INFILTRATION; INTRACAUDAL; PERINEURAL at 11:19

## 2017-12-12 RX ADMIN — DEXAMETHASONE SODIUM PHOSPHATE 4 MG: 10 INJECTION INTRAMUSCULAR; INTRAVENOUS at 13:48

## 2017-12-12 RX ADMIN — SIMETHICONE CHEW TAB 80 MG 80 MG: 80 TABLET ORAL at 17:57

## 2017-12-12 RX ADMIN — HYDROMORPHONE HYDROCHLORIDE 0.5 MG: 1 INJECTION, SOLUTION INTRAMUSCULAR; INTRAVENOUS; SUBCUTANEOUS at 17:16

## 2017-12-12 RX ADMIN — HYDROCODONE BITARTRATE AND ACETAMINOPHEN 1 TABLET: 7.5; 325 TABLET ORAL at 18:46

## 2017-12-12 RX ADMIN — VECURONIUM BROMIDE 2 MG: 1 INJECTION, POWDER, LYOPHILIZED, FOR SOLUTION INTRAVENOUS at 14:24

## 2017-12-12 RX ADMIN — HYDROMORPHONE HYDROCHLORIDE 0.5 MG: 1 INJECTION, SOLUTION INTRAMUSCULAR; INTRAVENOUS; SUBCUTANEOUS at 16:45

## 2017-12-12 RX ADMIN — PANTOPRAZOLE SODIUM 40 MG: 40 INJECTION, POWDER, FOR SOLUTION INTRAVENOUS at 11:45

## 2017-12-12 RX ADMIN — SODIUM CHLORIDE, POTASSIUM CHLORIDE, SODIUM LACTATE AND CALCIUM CHLORIDE: 600; 310; 30; 20 INJECTION, SOLUTION INTRAVENOUS at 13:38

## 2017-12-12 RX ADMIN — CEFAZOLIN SODIUM 2 G: 2 INJECTION, SOLUTION INTRAVENOUS at 13:38

## 2017-12-12 RX ADMIN — TRAZODONE HYDROCHLORIDE 50 MG: 50 TABLET ORAL at 20:32

## 2017-12-12 RX ADMIN — ONDANSETRON 4 MG: 2 INJECTION INTRAMUSCULAR; INTRAVENOUS at 19:45

## 2017-12-12 RX ADMIN — SCOPALAMINE 1 PATCH: 1 PATCH, EXTENDED RELEASE TRANSDERMAL at 11:44

## 2017-12-12 RX ADMIN — PROPOFOL 250 MG: 10 INJECTION, EMULSION INTRAVENOUS at 13:42

## 2017-12-12 RX ADMIN — Medication 10 ML: at 11:45

## 2017-12-12 RX ADMIN — ESMOLOL HYDROCHLORIDE 10 MG: 10 INJECTION, SOLUTION INTRAVENOUS at 13:42

## 2017-12-12 RX ADMIN — FENTANYL CITRATE 50 MCG: 50 INJECTION INTRAMUSCULAR; INTRAVENOUS at 16:06

## 2017-12-12 RX ADMIN — NEOSTIGMINE METHYLSULFATE 5 MG: 1 INJECTION, SOLUTION INTRAVENOUS at 15:25

## 2017-12-12 RX ADMIN — GLYCOPYRROLATE 0.8 MG: 0.2 INJECTION, SOLUTION INTRAMUSCULAR; INTRAVENOUS at 15:25

## 2017-12-12 NOTE — H&P (VIEW-ONLY)
"[facility] BARIATRIC SURGERY  [address]  [facility] [address] [phone]      Patient  Name:  Hao Platt  :  1980      Date of Visit: 2017      Chief Complaint:  weight gain; unable to maintain weight loss    History of Present Illness:  Hao Platt is a 37 y.o. female who presents today for evaluation, education and consultation regarding weight loss surgery.     Patient has been overweight for many years, with numerous failed dietary/weight loss attempts.  She now has obesity related comorbidities of GERD, back pain, osteoarthritis, peripheral edema and as such has decided to pursue weight loss surgery.    No change in medical hx since intake.  We discussed Zyprexa use, and she and her psychiatrist decided that benefits ot mental health outweight risks of weight regain.  Patient educated as to r/b/a of continuing drug known to cause weight gain.  Has been off Contrave x 1 month.    Review of data:    LJ: ambien+gabapentin  CBC: n/l  CMP: n/l  EGD: PQ, GE junction 38 cm, hx of hiatal hernia on previous endoscopy, but none seen on EGD.  DE reflux, gastritis  HP negative  Cardiac clearance:n/a  Echo: n/a  Stress test: n/a  PFTs: normal  Pulm clearance: n/a  EKG: NSR  CXR: n/l      Last tobacco: 9 months ago  Last NSAIDs: n/a  Last ASA: n/a  Last steroids: n/a  Last hormones: n/a      Past Medical History:   Diagnosis Date   • Anxiety    • Asthma     as a child   • Back pain    • Bipolar disorder    • Depression    • Fatigue    • GERD (gastroesophageal reflux disease)     feels as epigastric pain/chest pain if not taking PPI   • Hiatal hernia     on PPI.  Quit PPI, and had major epigastric pain, so back on PPI   • Hip pain     left hip \"locks up\" sometimes   • Insomnia    •  (normal spontaneous vaginal delivery)     x 3, no issues   • Peripheral edema    • Rectal bleeding     had C-scope.  from hemorrhoids   • Snoring     never tested for " "sleep apnea but thinks she might have it     Past Surgical History:   Procedure Laterality Date   • COLONOSCOPY  2015    \"conscious sedation does not work on me\" She thinks they just used Versed.  first attempt unable to complete.  Done for abdominal pain and constipation.   • [facility]  2010    [facility]   • LAPAROSCOPIC APPENDECTOMY  2013   • TUBAL ABDOMINAL LIGATION  2017       Allergies   Allergen Reactions   • Nuts Swelling     Tree nuts       Current Outpatient Prescriptions:   •  DiphenhydrAMINE HCl (BENADRYL ALLERGY PO), Take  by mouth., Disp: , Rfl:   •  DULoxetine (CYMBALTA) 30 MG capsule, Take 30 mg by mouth Daily., Disp: , Rfl:   •  escitalopram (LEXAPRO) 20 MG tablet, Take 20 mg by mouth Daily., Disp: , Rfl:   •  fluticasone (VERAMYST) 27.5 MCG/SPRAY nasal spray, 2 sprays into each nostril Daily., Disp: , Rfl:   •  gabapentin (NEURONTIN) 600 MG tablet, Take 600 mg by mouth 3 (Three) Times a Day., Disp: , Rfl:   •  OLANZapine (zyPREXA) 7.5 MG tablet, Take 7.5 mg by mouth Every Night., Disp: , Rfl:   •  omeprazole (priLOSEC) 20 MG capsule, Take 20 mg by mouth Daily., Disp: , Rfl:   •  traZODone (DESYREL) 50 MG tablet, Take 50 mg by mouth Every Night., Disp: , Rfl:   •  zolpidem (AMBIEN) 10 MG tablet, Take 10 mg by mouth At Night As Needed for Sleep., Disp: , Rfl:     Social History     Social History   • Marital status:      Spouse name: Hector Platt   • Number of children: 3   • Years of education: College     Occupational History   • Homemaker      Social History Main Topics   • Smoking status: Former Smoker     Years: 25.00     Types: Cigarettes     Quit date: 2016   • Smokeless tobacco: Never Used      Comment: Is not around secondhand smoke in the house or car   • Alcohol use Yes      Comment: infrequent glass of wine   • Drug use: No   • Sexual activity: Not on file     Other Topics Concern   • Not on file     Social History 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      Family History   Problem Relation Age of Onset   • Hypertension Mother    • Hypertension Maternal Grandmother    • Hypertension Maternal Grandfather    • Hypertension Paternal Grandmother        Review of Systems:  Constitutional:  The patient reports fatigue, weight gain and denies fevers and chills.  Cardiovascular:  The patient reports edema and denies HTN, HLD, CP, MI, heart disease and DVT.  Respiratory:  The patient reports asthma and denies apnea and PE.  Gastrointestinal:  The patient reports heartburn and denies pancreatitis, liver disease and IBS.  Genitourinary:  The patient denies renal insufficiency.    Musculoskeletal:  The patient reports joint pain and denies fibromyalgia, arthritis and autoimmune disease.  Neurological:  The patient reports none and denies seizure and stroke.  Psychiatric:  The patient reports anxiety, depression, bipolar disorder   Endocrine:  The patient reports none and denies diabetes, thyroid disease and gout.  Hematologic:  The patient reports none and denies anemia and bleeding disorder.  Skin:  The patient denies MRSA.    Physical Exam:  Vital Signs:  Weight: 121 kg (267 lb)   Body mass index is 44.43 kg/(m^2).  Temp: 97.9 °F (36.6 °C)   Heart Rate: 83   BP: 133/97     Physical Exam   Constitutional: She is oriented to person, place, and time. She appears well-developed and well-nourished. No distress.   HENT:   Head: Normocephalic and atraumatic.   Mouth/Throat: No oropharyngeal exudate.   Eyes: Conjunctivae and EOM are normal. Pupils are equal, round, and reactive to light.   Neck: Normal range of motion. Neck supple. No tracheal deviation present. No thyromegaly present.   Cardiovascular: Normal rate, regular rhythm and normal heart sounds.    Pulmonary/Chest: Effort normal and breath sounds normal. No respiratory distress.   Abdominal: Soft. She exhibits no distension. There is no tenderness.       Musculoskeletal: Normal range of motion. She exhibits no  edema or deformity.   Neurological: She is alert and oriented to person, place, and time. No cranial nerve deficit.   Skin: Skin is warm and dry. She is not diaphoretic. No erythema.   Psychiatric: She has a normal mood and affect. Her behavior is normal. Judgment and thought content normal.       Patient Active Problem List   Diagnosis   • Snoring   • Rectal bleeding   • Peripheral edema   •  (normal spontaneous vaginal delivery)   • Insomnia   • Hip pain   • Hiatal hernia   • GERD (gastroesophageal reflux disease)   • Fatigue   • Depression   • Bipolar disorder   • Back pain   • Asthma   • Anxiety       Assessment:    Hao Platt is a 37 y.o. year old female with medically complicated obesity.    Weight loss surgery is deemed medically necessary given the following obesity related comorbidities including osteoarthritis, back pain, GERD and edema with current Weight: 121 kg (267 lb) and Body mass index is 44.43 kg/(m^2)..    Patient is aware that surgery is a tool, and that weight loss is not guaranteed but only seen in the context of appropriate use, follow up and exercise.    The patient was present for an approximately a 2.5 hour discussion of the purpose of weight loss surgery, how WLS is a tool to assist in achieving weight loss goals, the most common complications and how best to avoid them, and the strategies for short and long term weight loss.  Ample opportunity to discuss questions was available both in group and during the time of individual examination.    I reviewed all available preop labs, Xrays, tests, clearances, etc and signed off on these in the record.  All of this in addition to the patient's unique history and exam has been taken into consideration in determining their appropriate candidacy for weight loss surgery.    Complications  of laparoscopic/possible robotic gastric sleeve were discussed. The patient is well aware of the potential complications of surgery that include but not  "limited to bleeding, infections, deep venous thrombosis, pulmonary embolism, pulmonary complications such as pneumonia, cardiac events, hernias, small bowel obstruction, damage to the spleen or other organs, bowel injury, disfiguring scars, failure to lose weight, need for additional surgery, conversion to an open procedure, and death. Patient is also aware of complications which apply in this particular procedure that can include but are not limited to a \"leak\" at the staple line which in some instances may require conversion to gastric bypass.    The patient is aware if a hiatal hernia is encountered, it likely will be repaired.  R/B/A Rx to hiatal hernia repair were discussed as outlined in our long consent form.  Briefly risks in addition to those for LSG include recurrent hernia, FAINA, dysphagia, esophageal injury, pneumothorax, injury to the vagus nerves, injury to the thoracic duct, aorta or vena cava.    Greater than 3 minutes was spent with the patient discussing avoiding all tobacco products and second hand smoke at least 2 weeks pre-operatively and 6 weeks post-operatively to minimize the risk of sleeve leak.  This included discussing the importance of avoiding even secondhand smoke as the risk of leak is increased.  Examples discussed:  I made it very clear that the patient understands they should avoid even riding in a car where someone has previously smoked in the last 2 weeks, living in a house where someone smokes (even if it's in a separate room/patio/attached garage, etc.) we discussed that they should not have a conversation with a group of people who are smoking even if it's outside.  They can be around wood burning fires and barbecue.  I told them I do not know if marijuana has a same effects but my overall recommendation is to avoid it for 2 weeks prior in 6 weeks after surgery.  They also are aware that nicotine may also increase the risk of leak and I strongly encouraged him to avoid that as " well for 2 weeks prior in 6 weeks after surgery.    Discussed the risks, benefits and alternative therapies at great length as outlined in our extensive consent forms, consent videos, and educational teaching process under the direction of the center's .    A copy of the patient's signed informed consent is on file.    Plan:  Laparoscopic sleeve gastrectomy         Rita Barnhart MD

## 2017-12-12 NOTE — ANESTHESIA PROCEDURE NOTES
Peripheral Block    Patient location during procedure: OR  Start time: 12/12/2017 1:45 PM  Stop time: 12/12/2017 1:48 PM  Reason for block: at surgeon's request and post-op pain management  Performed by  Anesthesiologist: CHICHI REILLY  Preanesthetic Checklist  Completed: patient identified, site marked, surgical consent, pre-op evaluation, timeout performed, IV checked, risks and benefits discussed and monitors and equipment checked  Prep:  Pt Position: supine  Sterile barriers:cap, gloves, sterile barriers and mask  Prep: ChloraPrep  Patient monitoring: blood pressure monitoring, continuous pulse oximetry and EKG  Procedure  Sedation:yes  Performed under: general  Guidance:ultrasound guided  Images:still images obtained    Laterality:Bilateral  Block Type:TAP  Injection Technique:single-shot  Needle Type:short-bevel and echogenic  Needle Gauge:20 G    Medications  Comment:Block Injection:  LA dose divided between Right and Left block       Adjuncts:  Decadron 4mg PSF, Buprenex 0.3mg (Per total volume of LA)  Local Injected:bupivacaine 0.25% Local Amount Injected:60mL  Post Assessment  Injection Assessment: negative aspiration for heme, incremental injection and no paresthesia on injection  Patient Tolerance:comfortable throughout block  Complications:no  Additional Notes      Under Ultrasound guidance, a BBraun 4inch 360 degree needle was advanced with Normal Saline hydro dissection of tissue.  The Internal Oblique and Transversus Abdominus muscles where visualized.  At or before the aponeurosis of Internal Oblique, local anesthetic spread was visualized in the Transversus Abdominus Plane. Injection was made incrementally with aspiration every 5 mls.  There was no  intravascular injection,  injection pressure was normal, there was no neural injection, and the procedure was completed without difficulty.  Thank You.

## 2017-12-12 NOTE — ANESTHESIA PROCEDURE NOTES
Airway  Urgency: elective    Airway not difficult    General Information and Staff    Patient location during procedure: OR  CRNA: SHILA DELANEY    Indications and Patient Condition  Indications for airway management: airway protection    Preoxygenated: yes  MILS not maintained throughout  Mask difficulty assessment: 1 - vent by mask    Final Airway Details  Final airway type: endotracheal airway      Successful airway: ETT  Cuffed: yes   Successful intubation technique: direct laryngoscopy  Endotracheal tube insertion site: oral  Blade: Thom  Blade size: #3  ETT size: 7.0 mm  Cormack-Lehane Classification: grade IIa - partial view of glottis  Placement verified by: chest auscultation and capnometry   Cuff volume (mL): 8  Measured from: lips  ETT to lips (cm): 20  Number of attempts at approach: 1    Additional Comments  Negative epigastric sounds, Breath sound equal bilaterally with symmetric chest rise and fall, atraumatic intubation after first attempt no change in lips or dentition

## 2017-12-12 NOTE — BRIEF OP NOTE
GASTRIC SLEEVE LAPAROSCOPIC, ESOPHAGOGASTRODUODENOSCOPY  Progress Note    Hao Platt  12/12/2017    Pre-op Diagnosis:   Obesity, Class III, BMI 40-49.9 (morbid obesity) [E66.01]       Post-Op Diagnosis Codes:     * Obesity, Class III, BMI 40-49.9 (morbid obesity) [E66.01]    Procedure/CPT® Codes:      Procedure(s):  GASTRIC SLEEVE LAPAROSCOPIC  ESOPHAGOGASTRODUODENOSCOPY    Surgeon(s):  Rita Barnhart MD    Anesthesia: General with Block    Staff:   Circulator: Jasmine Arellano RN; Hattie Alexandre RN  Scrub Person: Marizol Osorio; Janet Reid  Nursing Assistant: Yohana Miller    Estimated Blood Loss: 25 mL    Urine Voided: * No values recorded between 12/12/2017  1:38 PM and 12/12/2017  3:38 PM *    Specimens:                  ID Type Source Tests Collected by Time Destination   A : SUB-TOTAL GASTRECTOMY Tissue Stomach TISSUE EXAM Rita Barnhart MD 12/12/2017 1516          Drains:       [REMOVED] Naso/Oral/Gastric Tube 12/12/17 1415 orogastric 16 right mouth (Removed)   Removed 12/12/17 1438   none    Findings: normal gallbladder, no hiatal hernia    Complications: none immediate      Rita Barnhart MD     Date: 12/12/2017  Time: 3:38 PM

## 2017-12-12 NOTE — ANESTHESIA PREPROCEDURE EVALUATION
Anesthesia Evaluation     Patient summary reviewed and Nursing notes reviewed   no history of anesthetic complications:  NPO Solid Status: > 8 hours  NPO Liquid Status: > 8 hours     Airway   Mallampati: I  TM distance: >3 FB  Neck ROM: full  no difficulty expected  Dental - normal exam     Comment: VENEERS IN FRONT    Pulmonary - normal exam   (+) sleep apnea (NO OFFICIAL DIAGNOSIS BUT SNORES HEAVILY),   Cardiovascular - normal exam  Exercise tolerance: good (4-7 METS)    Rhythm: regular  Rate: normal    (-) hypertension, angina, WHITE      Neuro/Psych  (+) psychiatric history Anxiety, Depression and Bipolar,    GI/Hepatic/Renal/Endo    (+) obesity,  hiatal hernia, GERD well controlled,   (-) hepatitis, liver disease, no renal disease, diabetes, hypothyroidism    Musculoskeletal     (+) arthralgias, back pain,   Abdominal    Substance History      OB/GYN          Other   (+) arthritis                                   Anesthesia Plan    ASA 3     general   (LABS/STUDIES REVIEWED  Tap BLOCKS)  intravenous induction   Anesthetic plan and risks discussed with patient.    Plan discussed with CRNA.

## 2017-12-12 NOTE — ANESTHESIA POSTPROCEDURE EVALUATION
Patient: Hao Platt    Procedure Summary     Date Anesthesia Start Anesthesia Stop Room / Location    12/12/17 8708 1545 [facility] / [facility]       Procedure Diagnosis Surgeon Provider    GASTRIC SLEEVE LAPAROSCOPIC (N/A Abdomen); ESOPHAGOGASTRODUODENOSCOPY (N/A Esophagus) Obesity, Class III, BMI 40-49.9 (morbid obesity)  (Obesity, Class III, BMI 40-49.9 (morbid obesity) [E66.01]) MD Mansoor Justin MD          Anesthesia Type: general  Last vitals  BP   130/99 (12/12/17 1112)   Temp   98.4 °F (36.9 °C) (12/12/17 1112)   Pulse   92 (12/12/17 1112)   Resp   18 (12/12/17 1112)     SpO2   97 % (12/12/17 1112)     Post Anesthesia Care and Evaluation    Patient location during evaluation: PACU  Patient participation: complete - patient participated  Level of consciousness: awake and alert  Pain score: 0  Pain management: adequate  Airway patency: patent  Anesthetic complications: No anesthetic complications  PONV Status: none  Cardiovascular status: hemodynamically stable and acceptable  Respiratory status: nonlabored ventilation, acceptable and nasal cannula  Hydration status: acceptable

## 2017-12-12 NOTE — INTERVAL H&P NOTE
H&P reviewed. The patient was examined and there are no changes to the H&P.      
<<-----Click here for Discharge Medication Review

## 2017-12-12 NOTE — PLAN OF CARE
Problem: Bariatric Surgery (Open/Laparoscopic) (Adult,Pediatric)  Goal: Signs and Symptoms of Listed Potential Problems Will be Absent or Manageable (Bariatric Surgery)  Outcome: Ongoing (interventions implemented as appropriate)    12/12/17 5984   Bariatric Surgery (Open/Laparoscopic)   Problems Assessed (Bariatric Surgery) all   Problems Present (Bariatric Surgery) situational response  (pain)

## 2017-12-12 NOTE — OP NOTE
OPERATIVE REPORT    DATE: 12/12/17    PATIENT: Hao Platt    PREOPERATIVE DIAGNOSIS:    1. Morbid obesity with comorbidities    POSTOPERATIVE DIAGNOSIS:    1. Morbid obesity with multiple comorbidities.    PROCEDURES PERFORMED:  1. Laparoscopic sleeve gastrectomy (85% subtotal vertical gastrectomy) over a  36-Romanian bougie dilator.   2.  Esophagogastroduodenoscopy    SURGEON:  Rita Barnhart M.D.    ANESTHESIA:  General endotracheal with CAROL block    ESTIMATED BLOOD LOSS:   Less than 25 mL    FLUIDS:  Crystalloids.    SPECIMENS:  Subtotal gastrectomy.    DRAINS:  None.    COUNTS:  Correct.    COMPLICATIONS:  None.    FINDINGS: Normal gallbladder, no obvious hiatal hernia    INDICATIONS:   Hao Platt is a 37 y.o. year old female with morbid obesity and associated comorbidities who presents for elective laparoscopic sleeve gastrectomy. The patient has has undergone our extensive preoperative education, teaching, and consent process. Everything is in order and they wish to proceed.         DESCRIPTION OF PROCEDURE:   The patient was brought to the operating room and placed supine upon the operating room table. SCDs were placed. The patient underwent uneventful general endotracheal anesthesia and bilateral CAROL blocks per the anesthesiology staff.  She received subcutaneous Lovenox and was given 2 g Ancef. The abdomen was prepped with ChloraPrep and draped in the usual sterile fashion. An Ioban was used as well.  Timeout was performed.     A small transverse incision was made a few centimeters above and to the left of the umbilicus and the peritoneal cavity entered under direct camera visualization using a 5 mm 0° laparoscope and an Optiview trocar. The abdomen was then insufflated to a pressure of 16 mmHg with CO2 gas. Exploratory laparoscopy revealed no evidence of injury from the entrance technique. The gallbladder was normal  The liver had a uniform capsule and was moderate in size without  evidence of gross hepatomegaly or fibrosis.  There were some flimsy omental adhesions to a small preperitoneal umbilical hernia that were easily taken down and not protruding in to the hernia.  A 5 mm port was placed in the right mid abdomen laterally and a 15 mm port was placed just left of the umbilicus.  A 5 mm port was placed lateral and to the left to the first port and a 5 mm port was placed a handsbreadth lateral to that on the left.  A Sreekanth liver retractor was placed through a small subxiphoid stab incision and the the left lobe of the liver was elevated.     The stomach was decompressed with an orogastric tube, which was then withdrawn back into the esophagus. The greater curvature vessels were taken down with a Harmonic scalpel beginning at the midpoint of the stomach and continued up to the angle of His, including the short gastrics. The left miya was completely exposed and there was no sign of hiatal hernia here or anteriorly. This was photodocumented. The GE junction fat pad was elevated to make a clear landing zone for the stapler later. The greater curvature vessels were taken down with the Harmonic scalpel extending distally within 3 cm of the pylorus. Filmy adhesions to the stomach were taken down posteriorly. The OGT was removed, and the 36 German bougie dilator was passed transorally and positioned along the lesser curvature of the stomach with the tip at the pylorus. Using the bougie as template, a sleeve gastrectomy was performed with an articulating 60 mm [place] stapler bolstered by single Sondra-strips. The first load was black, and the rest of the loads (5) were green.The bougie was removed before the last staple load was fired. Care was taken to stay 1 cm away from the esophagus to prevent any esophagus fibers from being divided. The staple line was examined. There were several points of bleeding that required cautery for hemostasis. The gastrectomy specimen was removed through the 15 mm  port site in a specimen bag. The specimen was later examined, and the staples were well-formed. Palpation of the specimen revealed no masses. The staple line of the sleeve gastrectomy revealed staples that were well-formed. The upper abdomen was flooded with saline, and endoscopy was performed.     The flexible endoscope was passed transorally down to the pylorus easily. The sleeve extended to within 2-3 cm proximal to the pylorus and was hemostatic. The sleeve was not narrow or twisted. There was no hiatal hernia or distal esophagitis.  The esophagus was a little tortuous, but was otherwise normal. The scope was removed. The leak test was normal.     I rescrubbed and suctioned the irrigation fluid from the upper abdomen, making sure it was dry. The staple line on the stomach required some more hemostasis with cautery. The staple line was treated with 4 ml of aerosolized Tisseel fibrin glue. I examined the sleeve, and noticed it tended to try and roll/twist.  It caused no obstruction to endoscopy, but to make sure the sleeve did not try to twist in the future, I tacked the distal sleeve to the cut edge of omentum with 2-0 Vicryl.  The liver retractor was removed easily. The 15 mm port was removed under direct visualization and there was no bleeding. This incision was closed with an 0-vicryl transfascial suture using a suture passer under direct visualization in a horizontal mattress fashion. All other ports were removed and then replaced under direct visualization and all of these were hemostatic. The instruments were removed and the abdomen was desufflated. The ports were removed. A 2-0 vicryl was used to close the subcutaneous tissue in the 15 mm port site. 3-0 monocryl plus was used to close skin incisions with interrupted sutures. Skin Affix was placed. The patient was awakened and taken to recovery in good condition, having tolerated the procedure well. All sponge, needle, and instrument counts were  correct.

## 2017-12-13 ENCOUNTER — APPOINTMENT (OUTPATIENT)
Dept: GENERAL RADIOLOGY | Facility: HOSPITAL | Age: 37
End: 2017-12-13
Attending: SURGERY

## 2017-12-13 VITALS
DIASTOLIC BLOOD PRESSURE: 77 MMHG | WEIGHT: 267 LBS | HEIGHT: 65 IN | TEMPERATURE: 98.3 F | OXYGEN SATURATION: 93 % | RESPIRATION RATE: 20 BRPM | HEART RATE: 70 BPM | SYSTOLIC BLOOD PRESSURE: 121 MMHG | BODY MASS INDEX: 44.48 KG/M2

## 2017-12-13 LAB
ALBUMIN SERPL-MCNC: 4 G/DL (ref 3.2–4.8)
ALBUMIN/GLOB SERPL: 1.7 G/DL (ref 1.5–2.5)
ALP SERPL-CCNC: 60 U/L (ref 25–100)
ALT SERPL W P-5'-P-CCNC: 31 U/L (ref 7–40)
ANION GAP SERPL CALCULATED.3IONS-SCNC: 7 MMOL/L (ref 3–11)
AST SERPL-CCNC: 26 U/L (ref 0–33)
BASOPHILS # BLD AUTO: 0 10*3/MM3 (ref 0–0.2)
BASOPHILS NFR BLD AUTO: 0 % (ref 0–1)
BILIRUB SERPL-MCNC: 0.6 MG/DL (ref 0.3–1.2)
BUN BLD-MCNC: 12 MG/DL (ref 9–23)
BUN/CREAT SERPL: 17.1 (ref 7–25)
CALCIUM SPEC-SCNC: 8.5 MG/DL (ref 8.7–10.4)
CHLORIDE SERPL-SCNC: 100 MMOL/L (ref 99–109)
CO2 SERPL-SCNC: 26 MMOL/L (ref 20–31)
CREAT BLD-MCNC: 0.7 MG/DL (ref 0.6–1.3)
DEPRECATED RDW RBC AUTO: 37.8 FL (ref 37–54)
EOSINOPHIL # BLD AUTO: 0 10*3/MM3 (ref 0–0.3)
EOSINOPHIL NFR BLD AUTO: 0 % (ref 0–3)
ERYTHROCYTE [DISTWIDTH] IN BLOOD BY AUTOMATED COUNT: 12.6 % (ref 11.3–14.5)
GFR SERPL CREATININE-BSD FRML MDRD: 94 ML/MIN/1.73
GLOBULIN UR ELPH-MCNC: 2.3 GM/DL
GLUCOSE BLD-MCNC: 115 MG/DL (ref 70–100)
HCT VFR BLD AUTO: 37.1 % (ref 34.5–44)
HGB BLD-MCNC: 12.2 G/DL (ref 11.5–15.5)
IMM GRANULOCYTES # BLD: 0.03 10*3/MM3 (ref 0–0.03)
IMM GRANULOCYTES NFR BLD: 0.2 % (ref 0–0.6)
IRON 24H UR-MRATE: 24 MCG/DL (ref 50–175)
LYMPHOCYTES # BLD AUTO: 1.11 10*3/MM3 (ref 0.6–4.8)
LYMPHOCYTES NFR BLD AUTO: 8.4 % (ref 24–44)
MCH RBC QN AUTO: 26.9 PG (ref 27–31)
MCHC RBC AUTO-ENTMCNC: 32.9 G/DL (ref 32–36)
MCV RBC AUTO: 81.7 FL (ref 80–99)
MONOCYTES # BLD AUTO: 0.97 10*3/MM3 (ref 0–1)
MONOCYTES NFR BLD AUTO: 7.3 % (ref 0–12)
NEUTROPHILS # BLD AUTO: 11.16 10*3/MM3 (ref 1.5–8.3)
NEUTROPHILS NFR BLD AUTO: 84.1 % (ref 41–71)
PLATELET # BLD AUTO: 162 10*3/MM3 (ref 150–450)
PMV BLD AUTO: 9.4 FL (ref 6–12)
POTASSIUM BLD-SCNC: 4 MMOL/L (ref 3.5–5.5)
PROT SERPL-MCNC: 6.3 G/DL (ref 5.7–8.2)
RBC # BLD AUTO: 4.54 10*6/MM3 (ref 3.89–5.14)
SODIUM BLD-SCNC: 133 MMOL/L (ref 132–146)
WBC NRBC COR # BLD: 13.27 10*3/MM3 (ref 3.5–10.8)

## 2017-12-13 PROCEDURE — 0 DIATRIZOATE MEGLUMINE & SODIUM PER 1 ML: Performed by: SURGERY

## 2017-12-13 PROCEDURE — 25010000002 PYRIDOXINE PER 100 MG: Performed by: SURGERY

## 2017-12-13 PROCEDURE — 85025 COMPLETE CBC W/AUTO DIFF WBC: CPT | Performed by: SURGERY

## 2017-12-13 PROCEDURE — 74241: CPT

## 2017-12-13 PROCEDURE — 83540 ASSAY OF IRON: CPT | Performed by: SURGERY

## 2017-12-13 PROCEDURE — 25010000002 NA FERRIC GLUC CPLX PER 12.5 MG: Performed by: SURGERY

## 2017-12-13 PROCEDURE — 99024 POSTOP FOLLOW-UP VISIT: CPT | Performed by: SURGERY

## 2017-12-13 PROCEDURE — 25010000002 THIAMINE PER 100 MG: Performed by: SURGERY

## 2017-12-13 PROCEDURE — 25010000003 CEFAZOLIN IN DEXTROSE 2-4 GM/100ML-% SOLUTION: Performed by: SURGERY

## 2017-12-13 PROCEDURE — 25010000002 PROMETHAZINE PER 50 MG: Performed by: SURGERY

## 2017-12-13 PROCEDURE — 25010000002 ONDANSETRON PER 1 MG: Performed by: SURGERY

## 2017-12-13 PROCEDURE — 80053 COMPREHEN METABOLIC PANEL: CPT | Performed by: SURGERY

## 2017-12-13 PROCEDURE — 25010000002 ENOXAPARIN PER 10 MG: Performed by: SURGERY

## 2017-12-13 PROCEDURE — 25010000002 HYDROMORPHONE PER 4 MG: Performed by: SURGERY

## 2017-12-13 PROCEDURE — 25010000002 CYANOCOBALAMIN PER 1000 MCG: Performed by: SURGERY

## 2017-12-13 PROCEDURE — 25810000003 POTASSIUM CHLORIDE PER 2 MEQ: Performed by: SURGERY

## 2017-12-13 RX ORDER — PROMETHAZINE HYDROCHLORIDE 12.5 MG/1
12.5 TABLET ORAL EVERY 4 HOURS PRN
Qty: 20 TABLET | Refills: 0 | Status: SHIPPED | OUTPATIENT
Start: 2017-12-13 | End: 2018-01-08

## 2017-12-13 RX ORDER — HYDROMORPHONE HYDROCHLORIDE 2 MG/1
2 TABLET ORAL EVERY 4 HOURS PRN
Qty: 30 TABLET | Refills: 0 | Status: SHIPPED | OUTPATIENT
Start: 2017-12-13 | End: 2018-01-08

## 2017-12-13 RX ORDER — ONDANSETRON 4 MG/1
4 TABLET, FILM COATED ORAL EVERY 4 HOURS PRN
Qty: 20 TABLET | Refills: 0 | Status: SHIPPED | OUTPATIENT
Start: 2017-12-13 | End: 2018-01-08

## 2017-12-13 RX ADMIN — HYDROMORPHONE HYDROCHLORIDE 0.5 MG: 1 INJECTION, SOLUTION INTRAMUSCULAR; INTRAVENOUS; SUBCUTANEOUS at 09:49

## 2017-12-13 RX ADMIN — POTASSIUM CHLORIDE AND SODIUM CHLORIDE 125 ML/HR: 450; 150 INJECTION, SOLUTION INTRAVENOUS at 13:46

## 2017-12-13 RX ADMIN — ONDANSETRON 4 MG: 2 INJECTION INTRAMUSCULAR; INTRAVENOUS at 08:19

## 2017-12-13 RX ADMIN — FOLIC ACID 250 ML/HR: 5 INJECTION, SOLUTION INTRAMUSCULAR; INTRAVENOUS; SUBCUTANEOUS at 08:18

## 2017-12-13 RX ADMIN — ENOXAPARIN SODIUM 40 MG: 40 INJECTION SUBCUTANEOUS at 08:18

## 2017-12-13 RX ADMIN — ONDANSETRON 4 MG: 4 TABLET, FILM COATED ORAL at 13:58

## 2017-12-13 RX ADMIN — DULOXETINE HYDROCHLORIDE 30 MG: 30 CAPSULE, DELAYED RELEASE ORAL at 08:18

## 2017-12-13 RX ADMIN — PANTOPRAZOLE SODIUM 40 MG: 40 INJECTION, POWDER, FOR SOLUTION INTRAVENOUS at 05:40

## 2017-12-13 RX ADMIN — SODIUM CHLORIDE 125 MG: 9 INJECTION, SOLUTION INTRAVENOUS at 13:43

## 2017-12-13 RX ADMIN — PROMETHAZINE HYDROCHLORIDE 12.5 MG: 25 INJECTION INTRAMUSCULAR; INTRAVENOUS at 09:48

## 2017-12-13 RX ADMIN — CYANOCOBALAMIN 1000 MCG: 1000 INJECTION, SOLUTION INTRAMUSCULAR; SUBCUTANEOUS at 08:19

## 2017-12-13 RX ADMIN — ESCITALOPRAM OXALATE 20 MG: 20 TABLET ORAL at 08:19

## 2017-12-13 RX ADMIN — HYDROCODONE BITARTRATE AND ACETAMINOPHEN 1 TABLET: 7.5; 325 TABLET ORAL at 13:58

## 2017-12-13 RX ADMIN — PROMETHAZINE HYDROCHLORIDE 12.5 MG: 25 INJECTION INTRAMUSCULAR; INTRAVENOUS at 01:05

## 2017-12-13 RX ADMIN — SODIUM CHLORIDE, POTASSIUM CHLORIDE, SODIUM LACTATE AND CALCIUM CHLORIDE 150 ML/HR: 600; 310; 30; 20 INJECTION, SOLUTION INTRAVENOUS at 02:30

## 2017-12-13 RX ADMIN — CEFAZOLIN SODIUM 2 G: 2 INJECTION, SOLUTION INTRAVENOUS at 05:18

## 2017-12-13 RX ADMIN — Medication 30 ML: at 09:07

## 2017-12-13 RX ADMIN — HYDROMORPHONE HYDROCHLORIDE 0.5 MG: 1 INJECTION, SOLUTION INTRAMUSCULAR; INTRAVENOUS; SUBCUTANEOUS at 03:11

## 2017-12-13 NOTE — PROGRESS NOTES
Cc:  POD#1  She is alone in the room.  She had a lot of nausea earlier but feels better now and is tolerating her diet and desperately wants to go home.  She's involuting and voiding well.  She's passing flatus no bowel movement.  No pulmonary complaints.  No fever or tachycardia pulse 70 blood pressure 121/77 she is no apparent distress and is soft, nontender, nondistended, bowel sounds are present.  Wounds look okay.  CMP is normal except for glucose of 1:15 calcium of 8.5 iron is 24 white blood count 13.3 with 84 segs 8 lymphs 7 monocytes no bands H&H 12 and 37 upper GI is unremarkable    Impression: Doing okay some nausea but the patient says she is keeping things down wants to go home as above    Plan: Discharge home.  Discharge instructions were discussed.  See orders

## 2017-12-13 NOTE — PROGRESS NOTES
Discharge Planning Assessment  [facility]     Patient Name: Hao Platt  MRN: [id number]  Today's Date: 12/13/2017    Admit Date: 12/12/2017          Discharge Needs Assessment       12/13/17 1428    Living Environment    Lives With child(eugenio), dependent;spouse    Living Arrangements house    Home Accessibility no concerns    Stair Railings at Home none    Type of Financial/Environmental Concern none    Transportation Available car;family or friend will provide    Living Environment    Provides Primary Care For no one    Primary Care Provided By spouse/significant other    Quality Of Family Relationships supportive;helpful    Able to Return to Prior Living Arrangements yes    Discharge Needs Assessment    Concerns To Be Addressed no discharge needs identified;denies needs/concerns at this time    Readmission Within The Last 30 Days no previous admission in last 30 days    Anticipated Changes Related to Illness none    Equipment Currently Used at Home none    Equipment Needed After Discharge none    Discharge Disposition home or self-care            Discharge Plan       12/13/17 1428    Case Management/Social Work Plan    Plan Home at discharge     Patient/Family In Agreement With Plan yes    Additional Comments Patient lives with her spouse and children in [facility]. She is independent with her ADL's and has no current need for DME or home health. Her goal is to return home when medically ready and she denies any concerns regarding discharge - Cm following - aw [phone]         Discharge Placement     No information found                Demographic Summary       12/13/17 1427    Referral Information    Admission Type inpatient    Arrived From admitted as an inpatient    Referral Source admission list    Reason For Consult discharge planning    Record Reviewed history and physical;medical record    Contact Information    Permission Granted to Share Information With     Primary Care  Physician Information    Name Brit Carrasco             Functional Status       12/13/17 1427    Functional Status Current    Current Functional Level Comment Please see nursing notes     Functional Status Prior    Ambulation 0-->independent    Transferring 0-->independent    Toileting 0-->independent    Bathing 0-->independent    Dressing 0-->independent    Eating 0-->independent    Communication 0-->understands/communicates without difficulty    Swallowing 0-->swallows foods/liquids without difficulty    IADL    Medications independent    Meal Preparation independent    Housekeeping independent    Laundry independent    Shopping independent    Oral Care independent    Activity Tolerance    Current Activity Limitations none    Usual Activity Tolerance good    Current Activity Tolerance moderate    Cognitive/Perceptual/Developmental    Current Mental Status/Cognitive Functioning no deficits noted    Employment/Financial    Employment/Finance Comments [employer]             Psychosocial     None            Abuse/Neglect     None            Legal     None            Substance Abuse     None            Patient Forms     None          Preeti Trujillo RN

## 2017-12-13 NOTE — PLAN OF CARE
Problem: Patient Care Overview (Adult)  Goal: Plan of Care Review  Outcome: Ongoing (interventions implemented as appropriate)    12/13/17 5022   Coping/Psychosocial Response Interventions   Plan Of Care Reviewed With patient   Patient Care Overview   Progress improving   Outcome Evaluation   Outcome Summary/Follow up Plan ambulating well, tolerating PO protein, pain controlled with PO pain medication, passing flatus, reports she is ready to go home. d/c video watched and pt denies questions

## 2017-12-13 NOTE — PLAN OF CARE
Problem: Bariatric Surgery (Open/Laparoscopic) (Adult,Pediatric)  Goal: Signs and Symptoms of Listed Potential Problems Will be Absent or Manageable (Bariatric Surgery)  Outcome: Ongoing (interventions implemented as appropriate)    12/12/17 2892   Bariatric Surgery (Open/Laparoscopic)   Problems Assessed (Bariatric Surgery) all   Problems Present (Bariatric Surgery) situational response  (pain)

## 2017-12-18 ENCOUNTER — OFFICE VISIT (OUTPATIENT)
Dept: BARIATRICS/WEIGHT MGMT | Facility: CLINIC | Age: 37
End: 2017-12-18

## 2017-12-18 VITALS
OXYGEN SATURATION: 99 % | WEIGHT: 256.5 LBS | HEIGHT: 65 IN | SYSTOLIC BLOOD PRESSURE: 137 MMHG | DIASTOLIC BLOOD PRESSURE: 91 MMHG | RESPIRATION RATE: 18 BRPM | TEMPERATURE: 98.2 F | BODY MASS INDEX: 42.74 KG/M2 | HEART RATE: 97 BPM

## 2017-12-18 DIAGNOSIS — Z98.84 STATUS POST BARIATRIC SURGERY: ICD-10-CM

## 2017-12-18 DIAGNOSIS — E66.01 OBESITY, CLASS III, BMI 40-49.9 (MORBID OBESITY) (HCC): Primary | ICD-10-CM

## 2017-12-18 PROCEDURE — 99024 POSTOP FOLLOW-UP VISIT: CPT | Performed by: PHYSICIAN ASSISTANT

## 2017-12-18 RX ORDER — URSODIOL 300 MG/1
300 CAPSULE ORAL 2 TIMES DAILY
Qty: 60 CAPSULE | Refills: 5 | Status: SHIPPED | OUTPATIENT
Start: 2017-12-18 | End: 2018-01-17

## 2017-12-18 RX ORDER — OMEPRAZOLE 40 MG/1
40 CAPSULE, DELAYED RELEASE ORAL DAILY
Qty: 30 CAPSULE | Refills: 0 | Status: SHIPPED | OUTPATIENT
Start: 2017-12-18 | End: 2018-01-15 | Stop reason: SDUPTHER

## 2017-12-18 NOTE — PROGRESS NOTES
"[facility] Bariatric Surgery  [address]  [facility] [id number] [phone]      Patient Name:  Hao Platt.  :  1980      Date of Visit: 2017      Reason for Visit:  POD #6    HPI:  Hao Platt is a 37 y.o. female s/p LSG by  on 17    Discharged POD#1 with no concerns.    Doing well.  No issues/concerns.  She has had occasional nausea, alleviated by phenergan, continuing to improve. Abdominal \"soreness\" of epigastric region.  Denies dysphagia, reflux, vomiting, pulmonary issues and fevers.  Tolerating diet progression and has progressed faster than planned- on stage 2, has added in yogurt bc she is very hungry, not satisfied with liquid diet.    Getting 90+g prot/day with 3 protein shakes a day.  Drinking <64oz fluid oz/day.  Taking B1 and Patches: MVI and iron.  On Omeprazole .  Holding ASA , NSAIDs , Tramadol, Hormones, Diuretics , Steroids and Immunologics.  Ambulating.     Presurgery weight: 267 pounds.  Today's weight is 116 kg (256 lb 8 oz) pounds, today's  Body mass index is 42.68 kg/(m^2)., and her weight loss since surgery is 11 pounds.       Past Medical History:   Diagnosis Date   • Anxiety    • Asthma     as a child   • Back pain    • Bipolar disorder    • Depression    • Fatigue    • GERD (gastroesophageal reflux disease)     feels as epigastric pain/chest pain if not taking PPI   • Head injury    • Hiatal hernia     on PPI.  Quit PPI, and had major epigastric pain, so back on PPI   • Hip pain     left hip \"locks up\" sometimes   • Insomnia    •  (normal spontaneous vaginal delivery)     x 3, no issues   • Peripheral edema    • Rectal bleeding     had C-scope.  from hemorrhoids   • Snoring     never tested for sleep apnea but thinks she might have it     Past Surgical History:   Procedure Laterality Date   • COLONOSCOPY      \"conscious sedation does not work on me\" She thinks they just used Versed.  first " attempt unable to complete.  Done for abdominal pain and constipation.   • ENDOSCOPY  2010    [facility]   • ENDOSCOPY N/A 12/12/2017    Procedure: ESOPHAGOGASTRODUODENOSCOPY;  Surgeon: Rita Barnhart MD;  Location:  [facility] OR;  Service:    • GASTRIC SLEEVE LAPAROSCOPIC N/A 12/12/2017    Procedure: GASTRIC SLEEVE LAPAROSCOPIC;  Surgeon: Rita Barnhart MD;  Location:  [facility] OR;  Service:    • LAPAROSCOPIC APPENDECTOMY  2013   • SINUS SURGERY      X 2   • TUBAL ABDOMINAL LIGATION  2017     Outpatient Prescriptions Marked as Taking for the 12/18/17 encounter (Office Visit) with Yisel oGdinez PA-C   Medication Sig Dispense Refill   • DiphenhydrAMINE HCl (BENADRYL ALLERGY PO) Take 50 mg by mouth Every Night.     • DULoxetine (CYMBALTA) 30 MG capsule Take 30 mg by mouth Daily.     • escitalopram (LEXAPRO) 20 MG tablet Take 20 mg by mouth Daily.     • fluticasone (VERAMYST) 27.5 MCG/SPRAY nasal spray 2 sprays into each nostril Daily.     • gabapentin (NEURONTIN) 600 MG tablet Take 600 mg by mouth 3 (Three) Times a Day.     • HYDROmorphone (DILAUDID) 2 MG tablet Take 1 tablet by mouth Every 4 (Four) Hours As Needed for Moderate Pain . 30 tablet 0   • OLANZapine (zyPREXA) 7.5 MG tablet Take 7.5 mg by mouth Every Night.     • ondansetron (ZOFRAN) 4 MG tablet Take 1 tablet by mouth Every 4 (Four) Hours As Needed for Nausea. 20 tablet 0   • promethazine (PHENERGAN) 12.5 MG tablet Take 1 tablet by mouth Every 4 (Four) Hours As Needed for Nausea. 20 tablet 0   • traZODone (DESYREL) 50 MG tablet Take 50 mg by mouth Every Night.     • zolpidem (AMBIEN) 10 MG tablet Take 10 mg by mouth Every Night.     • [DISCONTINUED] omeprazole (priLOSEC) 20 MG capsule Take 20 mg by mouth Daily.       Allergies   Allergen Reactions   • Nuts Swelling     Tree nuts       Social History     Social History   • Marital status:      Spouse name: Hector Platt   • Number of children: 3   • Years of education: College  "    Occupational History   • Homemaker      Social History Main Topics   • Smoking status: Former Smoker     Years: 25.00     Types: Cigarettes     Quit date: 2016   • Smokeless tobacco: Never Used      Comment: Is not around secondhand smoke in the house or car   • Alcohol use No   • Drug use: No   • Sexual activity: Defer     Other Topics Concern   • Not on file     Social History Narrative    Homemaker.  Lives with  and three children.        /91 (BP Location: Left arm, Patient Position: Sitting, Cuff Size: Large Adult)  Pulse 97  Temp 98.2 °F (36.8 °C) (Temporal Artery )   Resp 18  Ht 165.1 cm (65\")  Wt 116 kg (256 lb 8 oz)  LMP 11/23/2017  SpO2 99%  BMI 42.68 kg/m2  Physical Exam   Constitutional: She appears well-developed and well-nourished.   HENT:   Head: Normocephalic and atraumatic.   Cardiovascular: Normal rate and regular rhythm.    Pulmonary/Chest: Effort normal and breath sounds normal.   Abdominal: Soft. Bowel sounds are normal. She exhibits no distension and no mass. There is no tenderness. No hernia.   Incisions healing well, some tissue swelling at periumbilical incision site, without erythema, fluctuance, drainage   Neurological: She is alert.   Skin: Skin is warm and dry.   Psychiatric: She has a normal mood and affect. Her behavior is normal.         Assessment:   POD #6 s/p LSG by  on 12/12/17    ICD-10-CM ICD-9-CM   1. Obesity, Class III, BMI 40-49.9 (morbid obesity) E66.01 278.01   2. Status post bariatric surgery Z98.84 V45.86         Plan:  Doing well. Continue to advance diet per manual.  Increase protein intake to 100g/day.  Increase exercise/activity as tolerated.  Reviewed lifting restrictions, nothing >25 lbs x 2 more weeks.  Continue vitamins.  Continue PPI, rx sent for 40mg omeprazole. Start actigall, rx sent. Continue to avoid ASA/NSAIDs/Steroids/tobacco x 6 weeks postop.  Call w/ problems/concerns.    The patient was instructed to follow up in 3 " weeks, sooner if needed.

## 2018-01-08 ENCOUNTER — OFFICE VISIT (OUTPATIENT)
Dept: BARIATRICS/WEIGHT MGMT | Facility: CLINIC | Age: 38
End: 2018-01-08

## 2018-01-08 VITALS
WEIGHT: 250.01 LBS | BODY MASS INDEX: 41.65 KG/M2 | OXYGEN SATURATION: 99 % | TEMPERATURE: 99.2 F | HEIGHT: 65 IN | SYSTOLIC BLOOD PRESSURE: 127 MMHG | HEART RATE: 81 BPM | RESPIRATION RATE: 18 BRPM | DIASTOLIC BLOOD PRESSURE: 78 MMHG

## 2018-01-08 DIAGNOSIS — E66.01 OBESITY, CLASS III, BMI 40-49.9 (MORBID OBESITY) (HCC): ICD-10-CM

## 2018-01-08 DIAGNOSIS — R53.83 FATIGUE, UNSPECIFIED TYPE: ICD-10-CM

## 2018-01-08 DIAGNOSIS — Z98.84 STATUS POST BARIATRIC SURGERY: Primary | ICD-10-CM

## 2018-01-08 DIAGNOSIS — R13.10 DYSPHAGIA, UNSPECIFIED TYPE: ICD-10-CM

## 2018-01-08 PROCEDURE — 99024 POSTOP FOLLOW-UP VISIT: CPT | Performed by: PHYSICIAN ASSISTANT

## 2018-01-08 NOTE — PROGRESS NOTES
"[facility] Bariatric Surgery  [address]  [facility] [address]  [phone]      Patient Name:  Hoa Platt.  :  1980      Date of Visit: 2018      Reason for Visit:  POD #27    HPI:  Hao Platt is a 37 y.o. female s/p LSG by  on 17     Very fatigued, feels drained. Very difficult to eat, complains of food getting stuck making it hard to breath. Dysphagia with meats (packaged tuna, bite size deli meat/ turkey cheese)  tolerating liquid and pills ok. She is having a really hard time getting protein shakes in, is tired of them.    C/o rare nausea/ vomiting with fatty meats. Denies reflux, abdominal pain, pulmonary issues and fevers.  On stage 5, although not tolerating meats and heavy foods well.  She is eating some yogurt.  Not tracking protein, getting less than 70g prot/day with 1.5 protein shakes a day.  Drinking 48oz fluid oz/day water.  Taking B1 and Patches: MVI and iron. She does find that patches are only on for half a day if she goes to gym, showers, etc.  On Omeprazole  and actigall.  Holding ASA , NSAIDs , Tramadol, Hormones, Diuretics , Steroids and Immunologics.  Very low energy, she is trying to exercise with at home video 3 days week.      Presurgery weight: 267 pounds.  Today's weight is 113 kg (250 lb 0.2 oz) pounds, today's  Body mass index is 41.6 kg/(m^2)., and her weight loss since surgery is 17 pounds.       Past Medical History:   Diagnosis Date   • Anxiety    • Asthma     as a child   • Back pain    • Bipolar disorder    • Depression    • Fatigue    • GERD (gastroesophageal reflux disease)     feels as epigastric pain/chest pain if not taking PPI   • Head injury    • Hiatal hernia     on PPI.  Quit PPI, and had major epigastric pain, so back on PPI   • Hip pain     left hip \"locks up\" sometimes   • Insomnia    •  (normal spontaneous vaginal delivery)     x 3, no issues   • Peripheral edema    • Rectal " "bleeding     had C-scope.  from hemorrhoids   • Snoring     never tested for sleep apnea but thinks she might have it     Past Surgical History:   Procedure Laterality Date   • COLONOSCOPY  2015    \"conscious sedation does not work on me\" She thinks they just used Versed.  first attempt unable to complete.  Done for abdominal pain and constipation.   • ENDOSCOPY  2010    [facility]   • ENDOSCOPY N/A 12/12/2017    Procedure: ESOPHAGOGASTRODUODENOSCOPY;  Surgeon: Rita Barnhart MD;  Location:  [facility] OR;  Service:    • GASTRIC SLEEVE LAPAROSCOPIC N/A 12/12/2017    Procedure: GASTRIC SLEEVE LAPAROSCOPIC;  Surgeon: Rita Barnhart MD;  Location:  [facility] OR;  Service:    • LAPAROSCOPIC APPENDECTOMY  2013   • SINUS SURGERY      X 2   • TUBAL ABDOMINAL LIGATION  2017     Outpatient Prescriptions Marked as Taking for the 1/8/18 encounter (Office Visit) with Yisel Godinez PA-C   Medication Sig Dispense Refill   • DiphenhydrAMINE HCl (BENADRYL ALLERGY PO) Take 50 mg by mouth Every Night.     • DULoxetine (CYMBALTA) 30 MG capsule Take 30 mg by mouth Daily.     • escitalopram (LEXAPRO) 20 MG tablet Take 20 mg by mouth Daily.     • fluticasone (VERAMYST) 27.5 MCG/SPRAY nasal spray 2 sprays into each nostril Daily.     • gabapentin (NEURONTIN) 600 MG tablet Take 600 mg by mouth 3 (Three) Times a Day.     • OLANZapine (zyPREXA) 7.5 MG tablet Take 7.5 mg by mouth Every Night.     • omeprazole (priLOSEC) 40 MG capsule Take 1 capsule by mouth Daily. 30 capsule 0   • traZODone (DESYREL) 50 MG tablet Take 50 mg by mouth Every Night.     • ursodiol (ACTIGALL) 300 MG capsule Take 1 capsule by mouth 2 (Two) Times a Day for 30 days. 60 capsule 5   • zolpidem (AMBIEN) 10 MG tablet Take 10 mg by mouth Every Night.     • [DISCONTINUED] ondansetron (ZOFRAN) 4 MG tablet Take 1 tablet by mouth Every 4 (Four) Hours As Needed for Nausea. 20 tablet 0     Allergies   Allergen Reactions   • Nuts Swelling     Tree nuts       Social " "History     Social History   • Marital status:      Spouse name: Hector Platt   • Number of children: 3   • Years of education: College     Occupational History   • Homemaker      Social History Main Topics   • Smoking status: Former Smoker     Years: 25.00     Types: Cigarettes     Quit date: 2016   • Smokeless tobacco: Never Used      Comment: Is not around secondhand smoke in the house or car   • Alcohol use No   • Drug use: No   • Sexual activity: Defer     Other Topics Concern   • Not on file     Social History Narrative    Homemaker.  Lives with  and three children.        /78 (BP Location: Left arm, Patient Position: Sitting, Cuff Size: Large Adult)  Pulse 81  Temp 99.2 °F (37.3 °C) (Temporal Artery )   Resp 18  Ht 165.1 cm (65\")  Wt 113 kg (250 lb 0.2 oz)  SpO2 99%  BMI 41.6 kg/m2  Physical Exam   Constitutional: She appears well-developed and well-nourished.   HENT:   Head: Normocephalic and atraumatic.   Cardiovascular: Normal rate and regular rhythm.    Pulmonary/Chest: Effort normal and breath sounds normal.   Abdominal: Soft. Bowel sounds are normal. She exhibits no distension and no mass. There is no tenderness. No hernia.   Incisions healing well, some tissue swelling at periumbilical incision site, without erythema, fluctuance, drainage   Neurological: She is alert.   Skin: Skin is warm and dry.   Psychiatric: She has a normal mood and affect. Her behavior is normal.         Assessment:   POD #27 s/p LSG by  on 12/12/17    ICD-10-CM ICD-9-CM   1. Status post bariatric surgery Z98.84 V45.86   2. Obesity, Class III, BMI 40-49.9 (morbid obesity) E66.01 278.01   3. Dysphagia, unspecified type R13.10 787.20   4. Fatigue, unspecified type R53.83 780.79         Plan:  At length discussion regarding need to get protein, 70-100g daily. Discussed other protein options, protein water, powder in broth.  Back of diet progression to foods that are well tolerated. " Recommended UGI for dysphagia, patient does not want to have this done at this time.  Discussed behavioral modification, eating slower, taking more time to eat, smaller bites, chewing thoroughly. If symptoms persist, she will let us know and we will order UGI.  Increase exercise/activity as tolerated.  Lifting restrictions lifted.  Continue vitamins.  Continue PPI, actigall. Continue to avoid ASA/NSAIDs/Steroids/tobacco x 6 weeks postop.  We will obtain labs and contact with changes as needed. Call w/ problems/concerns.    The patient was instructed to follow up in 2 months, sooner if needed.

## 2018-01-12 LAB
25(OH)D3+25(OH)D2 SERPL-MCNC: 42.5 NG/ML (ref 30–100)
A-TOCOPHEROL VIT E SERPL-MCNC: 11.6 MG/L (ref 5.3–16.8)
ALBUMIN SERPL-MCNC: 4.2 G/DL (ref 3.5–5.5)
ALBUMIN/GLOB SERPL: 1.6 {RATIO} (ref 1.2–2.2)
ALP SERPL-CCNC: 69 IU/L (ref 39–117)
ALT SERPL-CCNC: 19 IU/L (ref 0–32)
AST SERPL-CCNC: 18 IU/L (ref 0–40)
BASOPHILS # BLD AUTO: 0 X10E3/UL (ref 0–0.2)
BASOPHILS NFR BLD AUTO: 0 %
BILIRUB SERPL-MCNC: 0.5 MG/DL (ref 0–1.2)
BUN SERPL-MCNC: 17 MG/DL (ref 6–20)
BUN/CREAT SERPL: 19 (ref 9–23)
CALCIUM SERPL-MCNC: 9.4 MG/DL (ref 8.7–10.2)
CHLORIDE SERPL-SCNC: 102 MMOL/L (ref 96–106)
CO2 SERPL-SCNC: 25 MMOL/L (ref 18–29)
CREAT SERPL-MCNC: 0.88 MG/DL (ref 0.57–1)
EOSINOPHIL # BLD AUTO: 0 X10E3/UL (ref 0–0.4)
EOSINOPHIL NFR BLD AUTO: 0 %
ERYTHROCYTE [DISTWIDTH] IN BLOOD BY AUTOMATED COUNT: 14.1 % (ref 12.3–15.4)
FERRITIN SERPL-MCNC: 85 NG/ML (ref 15–150)
FOLATE SERPL-MCNC: 17.3 NG/ML
GLOBULIN SER CALC-MCNC: 2.6 G/DL (ref 1.5–4.5)
GLUCOSE SERPL-MCNC: 87 MG/DL (ref 65–99)
HCT VFR BLD AUTO: 37.2 % (ref 34–46.6)
HGB BLD-MCNC: 12.8 G/DL (ref 11.1–15.9)
IMM GRANULOCYTES # BLD: 0 X10E3/UL (ref 0–0.1)
IMM GRANULOCYTES NFR BLD: 0 %
IRON SERPL-MCNC: 34 UG/DL (ref 27–159)
LYMPHOCYTES # BLD AUTO: 2.4 X10E3/UL (ref 0.7–3.1)
LYMPHOCYTES NFR BLD AUTO: 33 %
MAGNESIUM SERPL-MCNC: 2 MG/DL (ref 1.6–2.3)
MCH RBC QN AUTO: 26.4 PG (ref 26.6–33)
MCHC RBC AUTO-ENTMCNC: 34.4 G/DL (ref 31.5–35.7)
MCV RBC AUTO: 77 FL (ref 79–97)
METHYLMALONATE SERPL-SCNC: 132 NMOL/L (ref 0–378)
MONOCYTES # BLD AUTO: 0.7 X10E3/UL (ref 0.1–0.9)
MONOCYTES NFR BLD AUTO: 9 %
NEUTROPHILS # BLD AUTO: 4.2 X10E3/UL (ref 1.4–7)
NEUTROPHILS NFR BLD AUTO: 58 %
PHOSPHATE SERPL-MCNC: 3.5 MG/DL (ref 2.5–4.5)
PLATELET # BLD AUTO: 147 X10E3/UL (ref 150–379)
POTASSIUM SERPL-SCNC: 4.3 MMOL/L (ref 3.5–5.2)
PREALB SERPL-MCNC: 20 MG/DL (ref 14–35)
PROT SERPL-MCNC: 6.8 G/DL (ref 6–8.5)
PTH-INTACT SERPL-MCNC: 44 PG/ML (ref 15–65)
RBC # BLD AUTO: 4.85 X10E6/UL (ref 3.77–5.28)
SODIUM SERPL-SCNC: 143 MMOL/L (ref 134–144)
VIT A SERPL-MCNC: 43 UG/DL (ref 20–65)
VIT B1 BLD-SCNC: 167.9 NMOL/L (ref 66.5–200)
WBC # BLD AUTO: 7.2 X10E3/UL (ref 3.4–10.8)
ZINC SERPL-MCNC: 78 UG/DL (ref 56–134)

## 2018-01-15 RX ORDER — OMEPRAZOLE 40 MG/1
CAPSULE, DELAYED RELEASE ORAL
Qty: 30 CAPSULE | Refills: 0 | Status: SHIPPED | OUTPATIENT
Start: 2018-01-15 | End: 2018-02-11 | Stop reason: SDUPTHER

## 2018-01-16 ENCOUNTER — TELEPHONE (OUTPATIENT)
Dept: BARIATRICS/WEIGHT MGMT | Facility: CLINIC | Age: 38
End: 2018-01-16

## 2018-01-16 RX ORDER — URSODIOL 250 MG/1
250 TABLET, FILM COATED ORAL
Qty: 60 TABLET | Refills: 5 | Status: SHIPPED | OUTPATIENT
Start: 2018-01-16 | End: 2018-06-12

## 2018-01-16 NOTE — TELEPHONE ENCOUNTER
I informed pt that you called in a tablet instead of a capsule, and that insurance should cover that better. I informed pt if she had any more problems that she could call us back. Thank you.

## 2018-01-16 NOTE — TELEPHONE ENCOUNTER
Pt called in and stated she went to  her refill of her actigall today and it was going to be $300. Pt wants to know if something else can be called in ? She did say she called her insurance company and that 500mg for 90 days would only be $97. Please advise. Thank you.

## 2018-02-13 RX ORDER — OMEPRAZOLE 40 MG/1
CAPSULE, DELAYED RELEASE ORAL
Qty: 30 CAPSULE | Refills: 0 | Status: SHIPPED | OUTPATIENT
Start: 2018-02-13 | End: 2018-03-12

## 2018-03-12 ENCOUNTER — OFFICE VISIT (OUTPATIENT)
Dept: BARIATRICS/WEIGHT MGMT | Facility: CLINIC | Age: 38
End: 2018-03-12

## 2018-03-12 VITALS
BODY MASS INDEX: 36.9 KG/M2 | RESPIRATION RATE: 18 BRPM | SYSTOLIC BLOOD PRESSURE: 114 MMHG | HEART RATE: 73 BPM | OXYGEN SATURATION: 99 % | DIASTOLIC BLOOD PRESSURE: 74 MMHG | HEIGHT: 65 IN | WEIGHT: 221.5 LBS | TEMPERATURE: 98.5 F

## 2018-03-12 DIAGNOSIS — R53.83 FATIGUE, UNSPECIFIED TYPE: Primary | ICD-10-CM

## 2018-03-12 DIAGNOSIS — Z13.21 MALNUTRITION SCREEN: ICD-10-CM

## 2018-03-12 DIAGNOSIS — K21.9 GASTROESOPHAGEAL REFLUX DISEASE, ESOPHAGITIS PRESENCE NOT SPECIFIED: ICD-10-CM

## 2018-03-12 DIAGNOSIS — Z98.84 STATUS POST BARIATRIC SURGERY: ICD-10-CM

## 2018-03-12 DIAGNOSIS — Z13.0 SCREENING, IRON DEFICIENCY ANEMIA: ICD-10-CM

## 2018-03-12 DIAGNOSIS — E55.9 HYPOVITAMINOSIS D: ICD-10-CM

## 2018-03-12 DIAGNOSIS — M19.90 OSTEOARTHRITIS, UNSPECIFIED OSTEOARTHRITIS TYPE, UNSPECIFIED SITE: ICD-10-CM

## 2018-03-12 DIAGNOSIS — M54.9 BACK PAIN, UNSPECIFIED BACK LOCATION, UNSPECIFIED BACK PAIN LATERALITY, UNSPECIFIED CHRONICITY: ICD-10-CM

## 2018-03-12 PROCEDURE — 99024 POSTOP FOLLOW-UP VISIT: CPT | Performed by: SURGERY

## 2018-03-12 RX ORDER — OMEPRAZOLE 40 MG/1
40 CAPSULE, DELAYED RELEASE ORAL 2 TIMES DAILY
Qty: 60 CAPSULE | Refills: 11 | Status: SHIPPED | OUTPATIENT
Start: 2018-03-12

## 2018-03-12 NOTE — PROGRESS NOTES
"[facility] Bariatric Surgery  [address]  [facility] [address] [phone]        Patient Name:  Hao Platt.  :  1980      Date of Visit: 3/12/2018      Reason for Visit:   3 months postop     HPI: Hao Platt is a 37 y.o. female s/p LSG by  on 17     Doing well.  No issues/concerns. Denies dysphagia, nausea, vomiting and abdominal pain.  +Reflux, on 40 mg daily omeprazole.  Takes a little bit of Tums on top of the PPI with good relief.  Getting 100g prot/day. 1 month labs revealed no deficiencies. Taking Patches: MVI, iron and B1 pill.  On Omeprazole .  Exercise: spin/yoga class, walks on treadmill, does exercise bike, does strength machines, varies but active at the gym.       Presurgery weight: 267 pounds.  Today's weight is 100 kg (221 lb 8 oz) pounds, today's  Body mass index is 36.86 kg/m²., and her weight loss since surgery is 46 pounds.  Frustrated hasn't lost more.    Drinks Premier protein x 2 per day.    Drinks NewWhey 42 g dissolved in water over 2 days.    Eats a salad with 2 grilled chicken tenders, but can't eat the whole bowl.  Gets down about 1 cup.    Takes 2 bites and she is \"done.\"  Has some food aversion.  Feels like the only reason she can eat the salad is because she takes olanzapine.          Past Medical History:   Diagnosis Date   • Anxiety    • Asthma     as a child   • Back pain    • Bipolar disorder    • Depression    • Fatigue    • GERD (gastroesophageal reflux disease)     feels as epigastric pain/chest pain if not taking PPI   • Head injury    • Hiatal hernia     on PPI.  Quit PPI, and had major epigastric pain, so back on PPI   • Hip pain     left hip \"locks up\" sometimes   • Insomnia    •  (normal spontaneous vaginal delivery)     x 3, no issues   • Peripheral edema    • Rectal bleeding     had C-scope.  from hemorrhoids   • Snoring     never tested for sleep apnea but thinks she might have it " "    Past Surgical History:   Procedure Laterality Date   • COLONOSCOPY  2015    \"conscious sedation does not work on me\" She thinks they just used Versed.  first attempt unable to complete.  Done for abdominal pain and constipation.   • ENDOSCOPY  2010    [facility]   • ENDOSCOPY N/A 12/12/2017    Procedure: ESOPHAGOGASTRODUODENOSCOPY;  Surgeon: Rita Barnhart MD;  Location:  [facility];  Service:    • GASTRIC SLEEVE LAPAROSCOPIC N/A 12/12/2017    Procedure: GASTRIC SLEEVE LAPAROSCOPIC;  Surgeon: Rita Barnhart MD;  Location:  [facility];  Service:    • LAPAROSCOPIC APPENDECTOMY  2013   • SINUS SURGERY      X 2   • TUBAL ABDOMINAL LIGATION  2017     Outpatient Prescriptions Marked as Taking for the 3/12/18 encounter (Office Visit) with Rita Barnhart MD   Medication Sig Dispense Refill   • DiphenhydrAMINE HCl (BENADRYL ALLERGY PO) Take 50 mg by mouth Every Night.     • DULoxetine (CYMBALTA) 30 MG capsule Take 30 mg by mouth Daily.     • escitalopram (LEXAPRO) 20 MG tablet Take 20 mg by mouth Daily.     • fluticasone (VERAMYST) 27.5 MCG/SPRAY nasal spray 2 sprays into each nostril Daily.     • gabapentin (NEURONTIN) 600 MG tablet Take 600 mg by mouth 3 (Three) Times a Day.     • OLANZapine (zyPREXA) 7.5 MG tablet Take 7.5 mg by mouth Every Night.     • traZODone (DESYREL) 50 MG tablet Take 50 mg by mouth Every Night.     • ursodiol (ACTIGALL) 250 MG tablet Take 1 tablet by mouth 2 (Two) Times a Day. 60 tablet 5   • zolpidem (AMBIEN) 10 MG tablet Take 10 mg by mouth Every Night.     • [DISCONTINUED] omeprazole (priLOSEC) 40 MG capsule TAKE ONE CAPSULE BY MOUTH DAILY 30 capsule 0       Allergies   Allergen Reactions   • Nuts Swelling     Tree nuts       Social History     Social History   • Marital status:      Spouse name: Hector Platt   • Number of children: 3   • Years of education: College     Occupational History   • Homemaker      Social History Main Topics   • Smoking status: " "Former Smoker     Years: 25.00     Types: Cigarettes     Quit date: 2016   • Smokeless tobacco: Never Used      Comment: Is not around secondhand smoke in the house or car   • Alcohol use No   • Drug use: No   • Sexual activity: Defer     Other Topics Concern   • Not on file     Social History Narrative    Homemaker.  Lives with  and three children.        /74 (BP Location: Left arm, Patient Position: Sitting, Cuff Size: Large Adult)   Pulse 73   Temp 98.5 °F (36.9 °C) (Temporal Artery )   Resp 18   Ht 165.1 cm (65\")   Wt 100 kg (221 lb 8 oz)   SpO2 99%   BMI 36.86 kg/m²     Physical Exam   Constitutional: She is oriented to person, place, and time.   HENT:   Head: Normocephalic and atraumatic.   Mouth/Throat: No oropharyngeal exudate.   Eyes: Conjunctivae and EOM are normal. Pupils are equal, round, and reactive to light.   Pulmonary/Chest: Effort normal. No respiratory distress.   Abdominal: Soft. She exhibits no distension. There is no tenderness.   Incisions well-healed   Neurological: She is alert and oriented to person, place, and time.   Skin: Skin is warm and dry. No rash noted. No erythema.   Psychiatric: She has a normal mood and affect. Her behavior is normal. Judgment and thought content normal.         Assessment:  3 months s/p LSG by  on 12/12/17    ICD-10-CM ICD-9-CM   1. Fatigue, unspecified type R53.83 780.79   2. Hypovitaminosis D E55.9 268.9   3. Malnutrition screen Z13.21 V77.2   4. Screening, iron deficiency anemia Z13.0 V78.0   5. Status post bariatric surgery Z98.84 V45.86   6. Back pain, unspecified back location, unspecified back pain laterality, unspecified chronicity M54.9 724.5   7. Gastroesophageal reflux disease, esophagitis presence not specified K21.9 530.81   8. Osteoarthritis, unspecified osteoarthritis type, unspecified site M19.90 715.90         Plan:  Doing well. Continue w/ good food choices and healthy habits.  Re: reflux, discussed UGI/ultrasound " to work up possible gallbladder pathology, patient declined.  Actigall is too expensive and not affordable.  Will go to bid omeprazole 40 mg.  Continue protein >70g/day.  Continue routine exercise.  Routine bariatric labs ordered.  Continue vitamins w/ adjustments pending lab results.  Call w/ problems/concerns.     The patient was instructed to follow up in 3 months, sooner if needed.    note: approx 15 of the 25 minute visit was spent counseling on nutrition and necessary dietary/lifestyle modifications.    Rita Barnhart MD

## 2018-03-15 LAB
25(OH)D3+25(OH)D2 SERPL-MCNC: 31.7 NG/ML (ref 30–100)
ALBUMIN SERPL-MCNC: 4.2 G/DL (ref 3.5–5.5)
ALBUMIN/GLOB SERPL: 1.6 {RATIO} (ref 1.2–2.2)
ALP SERPL-CCNC: 70 IU/L (ref 39–117)
ALT SERPL-CCNC: 17 IU/L (ref 0–32)
AST SERPL-CCNC: 21 IU/L (ref 0–40)
BASOPHILS # BLD AUTO: 0 X10E3/UL (ref 0–0.2)
BASOPHILS NFR BLD AUTO: 0 %
BILIRUB SERPL-MCNC: 0.3 MG/DL (ref 0–1.2)
BUN SERPL-MCNC: 23 MG/DL (ref 6–20)
BUN/CREAT SERPL: 27 (ref 9–23)
CALCIUM SERPL-MCNC: 9.3 MG/DL (ref 8.7–10.2)
CHLORIDE SERPL-SCNC: 103 MMOL/L (ref 96–106)
CO2 SERPL-SCNC: 22 MMOL/L (ref 18–29)
CREAT SERPL-MCNC: 0.86 MG/DL (ref 0.57–1)
EOSINOPHIL # BLD AUTO: 0 X10E3/UL (ref 0–0.4)
EOSINOPHIL NFR BLD AUTO: 0 %
ERYTHROCYTE [DISTWIDTH] IN BLOOD BY AUTOMATED COUNT: 14.4 % (ref 12.3–15.4)
FERRITIN SERPL-MCNC: 44 NG/ML (ref 15–150)
FOLATE SERPL-MCNC: 15.8 NG/ML
GFR SERPLBLD CREATININE-BSD FMLA CKD-EPI: 100 ML/MIN/1.73
GFR SERPLBLD CREATININE-BSD FMLA CKD-EPI: 87 ML/MIN/1.73
GLOBULIN SER CALC-MCNC: 2.6 G/DL (ref 1.5–4.5)
GLUCOSE SERPL-MCNC: 96 MG/DL (ref 65–99)
HCT VFR BLD AUTO: 40.2 % (ref 34–46.6)
HGB BLD-MCNC: 13.3 G/DL (ref 11.1–15.9)
IMM GRANULOCYTES # BLD: 0 X10E3/UL (ref 0–0.1)
IMM GRANULOCYTES NFR BLD: 0 %
IRON SERPL-MCNC: 24 UG/DL (ref 27–159)
LYMPHOCYTES # BLD AUTO: 2 X10E3/UL (ref 0.7–3.1)
LYMPHOCYTES NFR BLD AUTO: 25 %
MCH RBC QN AUTO: 27.3 PG (ref 26.6–33)
MCHC RBC AUTO-ENTMCNC: 33.1 G/DL (ref 31.5–35.7)
MCV RBC AUTO: 83 FL (ref 79–97)
METHYLMALONATE SERPL-SCNC: 104 NMOL/L (ref 0–378)
MONOCYTES # BLD AUTO: 0.7 X10E3/UL (ref 0.1–0.9)
MONOCYTES NFR BLD AUTO: 8 %
NEUTROPHILS # BLD AUTO: 5.4 X10E3/UL (ref 1.4–7)
NEUTROPHILS NFR BLD AUTO: 67 %
PLATELET # BLD AUTO: 160 X10E3/UL (ref 150–379)
POTASSIUM SERPL-SCNC: 4.4 MMOL/L (ref 3.5–5.2)
PREALB SERPL-MCNC: 17 MG/DL (ref 14–35)
PROT SERPL-MCNC: 6.8 G/DL (ref 6–8.5)
RBC # BLD AUTO: 4.87 X10E6/UL (ref 3.77–5.28)
SODIUM SERPL-SCNC: 141 MMOL/L (ref 134–144)
VIT B1 BLD-SCNC: 173.8 NMOL/L (ref 66.5–200)
WBC # BLD AUTO: 8.1 X10E3/UL (ref 3.4–10.8)

## 2018-03-27 RX ORDER — OMEPRAZOLE 40 MG/1
CAPSULE, DELAYED RELEASE ORAL
Qty: 30 CAPSULE | Refills: 0 | OUTPATIENT
Start: 2018-03-27

## 2018-06-12 ENCOUNTER — OFFICE VISIT (OUTPATIENT)
Dept: BARIATRICS/WEIGHT MGMT | Facility: CLINIC | Age: 38
End: 2018-06-12

## 2018-06-12 VITALS
DIASTOLIC BLOOD PRESSURE: 79 MMHG | RESPIRATION RATE: 18 BRPM | SYSTOLIC BLOOD PRESSURE: 121 MMHG | HEART RATE: 67 BPM | HEIGHT: 65 IN | TEMPERATURE: 99.2 F | BODY MASS INDEX: 31.82 KG/M2 | WEIGHT: 191.01 LBS | OXYGEN SATURATION: 99 %

## 2018-06-12 DIAGNOSIS — E66.9 OBESITY, CLASS I, BMI 30-34.9: ICD-10-CM

## 2018-06-12 DIAGNOSIS — Z98.84 S/P BARIATRIC SURGERY: ICD-10-CM

## 2018-06-12 DIAGNOSIS — Z13.21 MALNUTRITION SCREEN: ICD-10-CM

## 2018-06-12 DIAGNOSIS — Z86.39 H/O IRON DEFICIENCY: ICD-10-CM

## 2018-06-12 DIAGNOSIS — R10.13 DYSPEPSIA: Primary | ICD-10-CM

## 2018-06-12 PROCEDURE — 99214 OFFICE O/P EST MOD 30 MIN: CPT | Performed by: PHYSICIAN ASSISTANT

## 2018-06-12 NOTE — PROGRESS NOTES
"[facility]ariatric Surgery  [address]  [facility] [address] [phone]        Patient Name:  Hao Platt.  :  1980      Date of Visit: 2018      Reason for Visit:   6 months postop      HPI: Hao Platt is a 37 y.o. female s/p LSG by Dr. Barnhart on 17.    At LOV discussed some food aversions.  Says she wishes that her weight loss was more at this point, but then admits that she \"can't eat.\"    Says \"it hurts in my stomach\" when she tries to eat during the day, so she does not eat until the evenings - takes her nighttime meds (Ambien, Olanzapine, Trazadone, Benadryl , Gabapentin) which relaxes her and w/in 15-20 min she is able to eat (1 grilled  w/ 1/4 cup rice).  Occassionally has a Premiere Protein shake in the AM or a few almonds throughout the day, but often times just goes all day w/out eating.      Says if not completely relaxed, even the thought of the food will start to upset her stomach.  No abd.pain as long as she is not trying to eat.  If she tries to eat during the day or at supper w/ her family she will either have pain or have N/V.  Issues progressively worsening.  Continues on daily PPI - not ready to d/c, but denies reflux issues.      Denies any issues w/ food aversion or postprandial abd.pain preoperatively.  Follows w/ Psychiatrist Dr. Fraser but has not truly discussed her postop struggles.  Mom is a  and has her doing deep breathing techniques before trying to eat, but it is not helping.    Labs 3/12/18 - low iron.  Not currently taking any vitamins.    Presurgery weight: 267 pounds.  Today's weight is 86.6 kg (191 lb 0.2 oz) pounds, today's  Body mass index is 31.79 kg/m²., and her weight loss since surgery is 76 pounds.      Past Medical History:   Diagnosis Date   • Anxiety    • Asthma     as a child   • Back pain    • Bipolar disorder    • Depression    • Fatigue    • GERD " "(gastroesophageal reflux disease)     feels as epigastric pain/chest pain if not taking PPI   • Head injury    • Hiatal hernia     on PPI.  Quit PPI, and had major epigastric pain, so back on PPI   • Hip pain     left hip \"locks up\" sometimes   • Insomnia    •  (normal spontaneous vaginal delivery)     x 3, no issues   • Peripheral edema    • Rectal bleeding     had C-scope.  from hemorrhoids   • Snoring     never tested for sleep apnea but thinks she might have it     Past Surgical History:   Procedure Laterality Date   • COLONOSCOPY      \"conscious sedation does not work on me\" She thinks they just used Versed.  first attempt unable to complete.  Done for abdominal pain and constipation.   • ENDOSCOPY      [facility]   • ENDOSCOPY N/A 2017    Procedure: ESOPHAGOGASTRODUODENOSCOPY;  Surgeon: Rita Barnhart MD;  Location:  [facility];  Service:    • GASTRIC SLEEVE LAPAROSCOPIC N/A 2017    Procedure: GASTRIC SLEEVE LAPAROSCOPIC;  Surgeon: Rita Barnhart MD;  Location:  [facility];  Service:    • LAPAROSCOPIC APPENDECTOMY     • SINUS SURGERY      X 2   • TUBAL ABDOMINAL LIGATION  2017     Outpatient Prescriptions Marked as Taking for the 18 encounter (Office Visit) with ANA Zimmerman   Medication Sig Dispense Refill   • DiphenhydrAMINE HCl (BENADRYL ALLERGY PO) Take 50 mg by mouth Every Night.     • DULoxetine (CYMBALTA) 30 MG capsule Take 90 mg by mouth Daily.     • escitalopram (LEXAPRO) 20 MG tablet Take 20 mg by mouth Daily.     • fluticasone (VERAMYST) 27.5 MCG/SPRAY nasal spray 2 sprays into each nostril Daily.     • gabapentin (NEURONTIN) 600 MG tablet Take 600 mg by mouth 3 (Three) Times a Day.     • OLANZapine (zyPREXA) 7.5 MG tablet Take 7.5 mg by mouth Every Night.     • omeprazole (PRILOSEC) 40 MG capsule Take 1 capsule by mouth 2 (Two) Times a Day. 60 capsule 11   • traZODone (DESYREL) 50 MG tablet Take 50 mg by mouth Every Night.     • zolpidem " "(AMBIEN) 10 MG tablet Take 10 mg by mouth Every Night.         Allergies   Allergen Reactions   • Nuts Swelling     Tree nuts       Social History     Social History   • Marital status:      Spouse name: Hector Platt   • Number of children: 3   • Years of education: College     Occupational History   • Homemaker      Social History Main Topics   • Smoking status: Former Smoker     Years: 25.00     Types: Cigarettes     Quit date: 2016   • Smokeless tobacco: Never Used      Comment: Is not around secondhand smoke in the house or car   • Alcohol use No   • Drug use: No   • Sexual activity: Defer     Other Topics Concern   • Not on file     Social History Narrative    Homemaker.  Lives with  and three children.        /79 (BP Location: Left arm, Patient Position: Sitting, Cuff Size: Large Adult)   Pulse 67   Temp 99.2 °F (37.3 °C) (Temporal Artery )   Resp 18   Ht 165.1 cm (65\")   Wt 86.6 kg (191 lb 0.2 oz)   SpO2 99%   BMI 31.79 kg/m²     Physical Exam   Constitutional: She appears well-developed and well-nourished. She is cooperative.   HENT:   Mouth/Throat: Oropharynx is clear and moist and mucous membranes are normal.   Eyes: Conjunctivae are normal. No scleral icterus.   Cardiovascular: Normal rate.    Pulmonary/Chest: Effort normal.   Abdominal: Soft. There is no tenderness.   Musculoskeletal: Normal range of motion. She exhibits no edema.   Neurological: She is alert.   Skin: Skin is warm and dry. No rash noted.   Psychiatric: She has a normal mood and affect. Judgment normal.         Assessment:  6 months s/p LSG by Dr. Barnhart on 12/12/17.    ICD-10-CM ICD-9-CM   1. Dyspepsia R10.13 536.8   2. Malnutrition screen Z13.21 V77.2   3. H/O iron deficiency Z86.39 V12.3   4. Obesity, Class I, BMI 30-34.9 E66.9 278.00   5. S/P bariatric surgery Z98.84 V45.86         Plan:   Labs/UGI ordered.  Encouraged to discuss food aversion issues further w/ psych.  Further input pending " test results.  Call w/ any other problems/concerns.     The patient was instructed to follow up in 6 weeks, sooner if needed.

## 2018-06-15 LAB
ALBUMIN SERPL-MCNC: 4.5 G/DL (ref 3.5–5.5)
ALBUMIN/GLOB SERPL: 2 {RATIO} (ref 1.2–2.2)
ALP SERPL-CCNC: 71 IU/L (ref 39–117)
ALT SERPL-CCNC: 17 IU/L (ref 0–32)
AST SERPL-CCNC: 19 IU/L (ref 0–40)
BASOPHILS # BLD AUTO: 0 X10E3/UL (ref 0–0.2)
BASOPHILS NFR BLD AUTO: 0 %
BILIRUB SERPL-MCNC: 0.5 MG/DL (ref 0–1.2)
BUN SERPL-MCNC: 8 MG/DL (ref 6–20)
BUN/CREAT SERPL: 9 (ref 9–23)
CALCIUM SERPL-MCNC: 9.3 MG/DL (ref 8.7–10.2)
CHLORIDE SERPL-SCNC: 102 MMOL/L (ref 96–106)
CO2 SERPL-SCNC: 23 MMOL/L (ref 20–29)
CREAT SERPL-MCNC: 0.85 MG/DL (ref 0.57–1)
EOSINOPHIL # BLD AUTO: 0 X10E3/UL (ref 0–0.4)
EOSINOPHIL NFR BLD AUTO: 0 %
ERYTHROCYTE [DISTWIDTH] IN BLOOD BY AUTOMATED COUNT: 14.3 % (ref 12.3–15.4)
FERRITIN SERPL-MCNC: 27 NG/ML (ref 15–150)
FOLATE SERPL-MCNC: 12.1 NG/ML
GFR SERPLBLD CREATININE-BSD FMLA CKD-EPI: 101 ML/MIN/1.73
GFR SERPLBLD CREATININE-BSD FMLA CKD-EPI: 88 ML/MIN/1.73
GLOBULIN SER CALC-MCNC: 2.3 G/DL (ref 1.5–4.5)
GLUCOSE SERPL-MCNC: 84 MG/DL (ref 65–99)
HCT VFR BLD AUTO: 39.9 % (ref 34–46.6)
HGB BLD-MCNC: 13 G/DL (ref 11.1–15.9)
IMM GRANULOCYTES # BLD: 0 X10E3/UL (ref 0–0.1)
IMM GRANULOCYTES NFR BLD: 0 %
IRON SERPL-MCNC: 27 UG/DL (ref 27–159)
LYMPHOCYTES # BLD AUTO: 2.2 X10E3/UL (ref 0.7–3.1)
LYMPHOCYTES NFR BLD AUTO: 31 %
Lab: NORMAL
MCH RBC QN AUTO: 27.4 PG (ref 26.6–33)
MCHC RBC AUTO-ENTMCNC: 32.6 G/DL (ref 31.5–35.7)
MCV RBC AUTO: 84 FL (ref 79–97)
METHYLMALONATE SERPL-SCNC: 75 NMOL/L (ref 0–378)
MONOCYTES # BLD AUTO: 0.6 X10E3/UL (ref 0.1–0.9)
MONOCYTES NFR BLD AUTO: 8 %
NEUTROPHILS # BLD AUTO: 4.5 X10E3/UL (ref 1.4–7)
NEUTROPHILS NFR BLD AUTO: 61 %
PLATELET # BLD AUTO: 170 X10E3/UL (ref 150–379)
POTASSIUM SERPL-SCNC: 4.4 MMOL/L (ref 3.5–5.2)
PREALB SERPL-MCNC: 20 MG/DL (ref 14–35)
PROT SERPL-MCNC: 6.8 G/DL (ref 6–8.5)
RBC # BLD AUTO: 4.74 X10E6/UL (ref 3.77–5.28)
SODIUM SERPL-SCNC: 140 MMOL/L (ref 134–144)
VIT B1 BLD-SCNC: 99.2 NMOL/L (ref 66.5–200)
WBC # BLD AUTO: 7.4 X10E3/UL (ref 3.4–10.8)

## 2018-06-19 ENCOUNTER — HOSPITAL ENCOUNTER (OUTPATIENT)
Dept: GENERAL RADIOLOGY | Facility: HOSPITAL | Age: 38
Discharge: HOME | End: 2018-06-19
Admitting: PHYSICIAN ASSISTANT

## 2018-06-19 DIAGNOSIS — R10.13 DYSPEPSIA: ICD-10-CM

## 2018-06-19 PROCEDURE — 74241: CPT

## 2018-06-19 RX ADMIN — BARIUM SULFATE 183 ML: 960 POWDER, FOR SUSPENSION ORAL at 10:10

## 2018-06-22 ENCOUNTER — TELEPHONE (OUTPATIENT)
Dept: BARIATRICS/WEIGHT MGMT | Facility: CLINIC | Age: 38
End: 2018-06-22

## 2018-06-22 NOTE — TELEPHONE ENCOUNTER
"----- Message from Rita Barnhart MD sent at 6/21/2018  4:54 PM EDT -----  I think UGI looks great, agree with report.    We can offer her EGD to rule out ulcer or gastritis.      ----- Message -----  From: ANA Zimmerman  Sent: 6/20/2018   2:48 PM  To: Rita Barnhart MD    Please review / advise...    37 y.o. Female 6 months s/p LSG by Dr. Barnhart on 12/12/17.    Presurgery weight: 267 pounds.  Recent weight is 86.6 kg (191 lb 0.2 oz) pounds,  Body mass index is 31.79 kg/m²., and her weight loss since surgery is 76 pounds.     At LOV discussed some food aversions.  Says she wishes that her weight loss was more at this point, but then admits that she \"can't eat.\"     Says \"it hurts in my stomach\" when she tries to eat during the day, so she does not eat until the evenings - takes her nighttime meds (Ambien, Olanzapine, Trazadone, Benadryl , Gabapentin) which relaxes her and w/in 15-20 min she is able to eat (1 grilled  w/ 1/4 cup rice).  Occassionally has a Premiere Protein shake in the AM or a few almonds throughout the day, but often times just goes all day w/out eating.       Says if not completely relaxed, even the thought of the food will start to upset her stomach.  No abd.pain as long as she is not trying to eat.  If she tries to eat during the day or at supper w/ her family she will either have pain or have N/V.  Issues progressively worsening.  Continues on daily PPI - not ready to d/c, but denies reflux issues.       Denies any issues w/ food aversion or postprandial abd.pain preoperatively.  Follows w/ Psychiatrist Dr. Fraser but has not truly discussed her postop struggles.  Mom is a  and has her doing deep breathing techniques before trying to eat, but it is not helping.    Advised she talk w/ her psychiatrist further.  Ordered UGI - normal.  Would you recommend an EGD as well?  Thanks!          ----- Message -----  From: Interface, Rad Results " [place]  Sent: 6/19/2018   4:37 PM  To: ANA Zimmerman

## 2018-06-22 NOTE — TELEPHONE ENCOUNTER
Notified pt and let her know that her UGI looked ok.  I let her know that Dr. Barnhart advises and EGD to further evaluate.  I also let pt know that you all want her to be sure she discusses her eating issues with her psychiatrist further as well.  The pt stated that she has been seeing her psychiatrist and her psychiatrist thinks it is her anxiety not letting her eat.  The pt does not want to proceed with an EGD at this time, but she did request Dr. Kinsey number and stated she is going to try and schedule an appointment with him and see if he has a different opinion. Pt stated that if she changes her mind about the EGD she will contact our office.  But she feels that it is just her and not anything else.

## 2018-07-09 ENCOUNTER — TELEPHONE (OUTPATIENT)
Dept: BARIATRICS/WEIGHT MGMT | Facility: CLINIC | Age: 38
End: 2018-07-09

## 2018-07-09 DIAGNOSIS — R11.0 NAUSEA: Primary | ICD-10-CM

## 2018-07-09 NOTE — TELEPHONE ENCOUNTER
Pt called and stated last night she went to the ED due to chest pain. She states that they ruled out anything to do with her heart or her lungs. She is wondering what she should do now? She states she is still having chest pain, and sometimes it is very sharp. She states it is on her left side right under her breast and radiates to her back. Please advise. Thanks.

## 2021-09-24 ENCOUNTER — OFFICE VISIT (OUTPATIENT)
Dept: BARIATRICS/WEIGHT MGMT | Facility: CLINIC | Age: 41
End: 2021-09-24

## 2021-09-24 VITALS
TEMPERATURE: 97.8 F | SYSTOLIC BLOOD PRESSURE: 126 MMHG | RESPIRATION RATE: 16 BRPM | BODY MASS INDEX: 40.15 KG/M2 | HEIGHT: 65 IN | OXYGEN SATURATION: 98 % | WEIGHT: 241 LBS | HEART RATE: 88 BPM | DIASTOLIC BLOOD PRESSURE: 82 MMHG

## 2021-09-24 DIAGNOSIS — Z13.21 MALNUTRITION SCREEN: ICD-10-CM

## 2021-09-24 DIAGNOSIS — Z13.0 SCREENING, IRON DEFICIENCY ANEMIA: ICD-10-CM

## 2021-09-24 DIAGNOSIS — E55.9 HYPOVITAMINOSIS D: ICD-10-CM

## 2021-09-24 DIAGNOSIS — K91.2 POSTGASTRECTOMY MALABSORPTION: ICD-10-CM

## 2021-09-24 DIAGNOSIS — Z90.3 POSTGASTRECTOMY MALABSORPTION: ICD-10-CM

## 2021-09-24 DIAGNOSIS — R53.83 FATIGUE, UNSPECIFIED TYPE: Primary | ICD-10-CM

## 2021-09-24 PROCEDURE — 99203 OFFICE O/P NEW LOW 30 MIN: CPT | Performed by: SURGERY

## 2021-09-24 RX ORDER — METOPROLOL SUCCINATE 50 MG/1
50 TABLET, EXTENDED RELEASE ORAL DAILY
COMMUNITY
Start: 2021-09-23

## 2021-09-24 RX ORDER — BUPROPION HYDROCHLORIDE 300 MG/1
300 TABLET ORAL DAILY
COMMUNITY
Start: 2021-07-26

## 2021-09-24 RX ORDER — VENLAFAXINE HYDROCHLORIDE 37.5 MG/1
37.5 CAPSULE, EXTENDED RELEASE ORAL EVERY MORNING
COMMUNITY
Start: 2021-09-17

## 2021-09-24 RX ORDER — OMEPRAZOLE 20 MG/1
20 CAPSULE, DELAYED RELEASE ORAL DAILY
COMMUNITY

## 2021-09-24 RX ORDER — ACYCLOVIR 400 MG/1
400 TABLET ORAL 2 TIMES DAILY
COMMUNITY
Start: 2021-09-10

## 2021-09-24 RX ORDER — OLANZAPINE 7.5 MG/1
TABLET ORAL EVERY 24 HOURS
COMMUNITY
End: 2021-09-24

## 2021-09-24 RX ORDER — SPIRONOLACTONE 50 MG/1
50 TABLET, FILM COATED ORAL 2 TIMES DAILY
COMMUNITY
Start: 2021-09-16

## 2021-09-24 NOTE — PROGRESS NOTES
"[facility] Bariatric Surgery  [address]  [address]  [facility] [id number] [phone]        Patient Name: Hao Platt.  YOB: 1980      Date of Visit: 2021      Reason for Visit:  Almost 4 years s/p LSG    HPI:  Hao Platt is a 41 y.o. female s/p LSG by Dr. Barnhart on 12/12/17    LOV 2018, presents today after prolonged absence.  Did not follow up b/c she didn't think she needed to since vitamins had been normal.  Actually had iron deficiency anemia necessitating iron infusion last year.    Lowest weight was 160.   Gained 80 pounds during COVID.      Tried intermittent fasting, not successful with losing weight.  Taking Zyprexa--psychiatrist is aware of my recommendation not to take this med for its weight loss side effect.  Was not able to come off of it b/c no other meds controlled her mood.    Protein intake: unsure.  Attributes a lot of weight regain to stress eating.  Gets 10,000 steps in per day.    She is now working with a therapist re: emotional eating.    Takes metoprolol at night for panic attacks.    Denies all GI complaints.  No reflux on omeprazole 20 mg daily.  Previously on 40 mg bid.    Does not take any vitamins.      Presurgery weight: 267 pounds. Today's weight is 109 kg (241 lb) pounds, today's  Body mass index is 40.1 kg/m²., and her weight loss since surgery is 26 pounds.         Past Medical History:   Diagnosis Date   • Anxiety    • Asthma     as a child   • Back pain    • Bipolar disorder (CMS/[facility])    • Depression    • Fatigue    • GERD (gastroesophageal reflux disease)     feels as epigastric pain/chest pain if not taking PPI   • Head injury    • Hiatal hernia     on PPI.  Quit PPI, and had major epigastric pain, so back on PPI   • Hip pain     left hip \"locks up\" sometimes   • Insomnia    •  (normal spontaneous vaginal delivery)     x 3, no issues   • Peripheral edema    • Rectal bleeding     had C-scope.  from " "hemorrhoids   • Snoring     never tested for sleep apnea but thinks she might have it     Past Surgical History:   Procedure Laterality Date   • COLONOSCOPY  2015    \"conscious sedation does not work on me\" She thinks they just used Versed.  first attempt unable to complete.  Done for abdominal pain and constipation.   • ENDOSCOPY  2010    [facility]   • ENDOSCOPY N/A 12/12/2017    Procedure: ESOPHAGOGASTRODUODENOSCOPY;  Surgeon: Rita Barnhart MD;  Location:  [facility] OR;  Service:    • GASTRIC SLEEVE LAPAROSCOPIC N/A 12/12/2017    Procedure: GASTRIC SLEEVE LAPAROSCOPIC;  Surgeon: Rita Barnhart MD;  Location:  [facility] OR;  Service:    • LAPAROSCOPIC APPENDECTOMY  2013   • SINUS SURGERY      X 2   • TUBAL ABDOMINAL LIGATION  2017     Outpatient Medications Marked as Taking for the 9/24/21 encounter (Office Visit) with Rita Barnhart MD   Medication Sig Dispense Refill   • acyclovir (ZOVIRAX) 400 MG tablet Take 400 mg by mouth 2 (Two) Times a Day.     • buPROPion XL (WELLBUTRIN XL) 300 MG 24 hr tablet Take 300 mg by mouth Daily.     • DiphenhydrAMINE HCl (BENADRYL ALLERGY PO) Take 50 mg by mouth Every Night.     • escitalopram (LEXAPRO) 20 MG tablet Take 20 mg by mouth Daily.     • gabapentin (NEURONTIN) 600 MG tablet Take 600 mg by mouth 3 (Three) Times a Day.     • metoprolol succinate XL (TOPROL-XL) 50 MG 24 hr tablet Take 50 mg by mouth Daily.     • OLANZapine (zyPREXA) 7.5 MG tablet Take 7.5 mg by mouth Every Night.     • omeprazole (priLOSEC) 20 MG capsule Take 20 mg by mouth Daily.     • omeprazole (PRILOSEC) 40 MG capsule Take 1 capsule by mouth 2 (Two) Times a Day. 60 capsule 11   • spironolactone (ALDACTONE) 50 MG tablet Take 50 mg by mouth 2 (two) times a day.     • venlafaxine XR (EFFEXOR-XR) 37.5 MG 24 hr capsule Take 37.5 mg by mouth Every Morning.     • zolpidem (AMBIEN) 10 MG tablet Take 10 mg by mouth Every Night.     • [DISCONTINUED] OLANZapine (zyPREXA) 7.5 MG tablet Daily.   "     Allergies   Allergen Reactions   • Lamotrigine Rash       Social History     Socioeconomic History   • Marital status:      Spouse name: Hector Platt   • Number of children: 3   • Years of education: College   • Highest education level: Not on file   Tobacco Use   • Smoking status: Former Smoker     Years: 25.00     Types: Cigarettes     Quit date: 2016     Years since quittin.7   • Smokeless tobacco: Never Used   • Tobacco comment: Is not around secondhand smoke in the house or car   Substance and Sexual Activity   • Alcohol use: No   • Drug use: No   • Sexual activity: Defer     Birth control/protection: Surgical       Vitals:    21 1107   BP: 126/82   Pulse: 88   Resp: 16   Temp: 97.8 °F (36.6 °C)   SpO2: 98%     Weight 109 kg (241 lb)  Body mass index is 40.1 kg/m².    Physical Exam  Constitutional:       General: She is not in acute distress.     Appearance: She is well-developed. She is not diaphoretic.   HENT:      Head: Normocephalic and atraumatic.      Mouth/Throat:      Pharynx: No oropharyngeal exudate.   Eyes:      Conjunctiva/sclera: Conjunctivae normal.      Pupils: Pupils are equal, round, and reactive to light.   Pulmonary:      Effort: Pulmonary effort is normal. No respiratory distress.   Abdominal:      General: There is no distension.      Palpations: Abdomen is soft.   Skin:     General: Skin is warm and dry.      Coloration: Skin is not pale.   Neurological:      Mental Status: She is alert and oriented to person, place, and time.      Cranial Nerves: No cranial nerve deficit.   Psychiatric:         Behavior: Behavior normal.         Thought Content: Thought content normal.           Assessment:      ICD-10-CM ICD-9-CM   1. Fatigue, unspecified type  R53.83 780.79   2. Hypovitaminosis D  E55.9 268.9   3. Malnutrition screen  Z13.21 V77.2   4. Screening, iron deficiency anemia  Z13.0 V78.0   5. Postgastrectomy malabsorption  K91.2 579.3    Z90.3       Almost 4  years s/p LSG    Plan:  Continue w/ good food choices and healthy habits.  Encouraged to focus on high protein, low carb.  Continue routine exercise.  Routine labs ordered.  Continue current vitamin regimen w/ adjustments pending lab results.  Call w/ issues/concerns.  RTC sooner w/ problems.     Refer [facility] and exercise physiologist.  Pt can consider revision surgery by filling out revision packet.    The patient was instructed to follow up in 1 year.

## 2021-10-01 LAB
25(OH)D3+25(OH)D2 SERPL-MCNC: 44.3 NG/ML (ref 30–100)
ALBUMIN SERPL-MCNC: 4.3 G/DL (ref 3.8–4.8)
ALBUMIN/GLOB SERPL: 1.9 {RATIO} (ref 1.2–2.2)
ALP SERPL-CCNC: 71 IU/L (ref 44–121)
ALT SERPL-CCNC: 23 IU/L (ref 0–32)
AST SERPL-CCNC: 24 IU/L (ref 0–40)
BASOPHILS # BLD AUTO: 0 X10E3/UL (ref 0–0.2)
BASOPHILS NFR BLD AUTO: 1 %
BILIRUB SERPL-MCNC: 0.3 MG/DL (ref 0–1.2)
BUN SERPL-MCNC: 10 MG/DL (ref 6–24)
BUN/CREAT SERPL: 9 (ref 9–23)
CALCIUM SERPL-MCNC: 9.2 MG/DL (ref 8.7–10.2)
CHLORIDE SERPL-SCNC: 104 MMOL/L (ref 96–106)
CO2 SERPL-SCNC: 22 MMOL/L (ref 20–29)
CREAT SERPL-MCNC: 1.16 MG/DL (ref 0.57–1)
EOSINOPHIL # BLD AUTO: 0 X10E3/UL (ref 0–0.4)
EOSINOPHIL NFR BLD AUTO: 0 %
ERYTHROCYTE [DISTWIDTH] IN BLOOD BY AUTOMATED COUNT: 14.4 % (ref 11.7–15.4)
FERRITIN SERPL-MCNC: 8 NG/ML (ref 15–150)
FOLATE SERPL-MCNC: 13.3 NG/ML
GLOBULIN SER CALC-MCNC: 2.3 G/DL (ref 1.5–4.5)
GLUCOSE SERPL-MCNC: 90 MG/DL (ref 65–99)
HCT VFR BLD AUTO: 36.5 % (ref 34–46.6)
HGB BLD-MCNC: 11.5 G/DL (ref 11.1–15.9)
IMM GRANULOCYTES # BLD AUTO: 0 X10E3/UL (ref 0–0.1)
IMM GRANULOCYTES NFR BLD AUTO: 0 %
IRON SERPL-MCNC: 27 UG/DL (ref 27–159)
LYMPHOCYTES # BLD AUTO: 1.4 X10E3/UL (ref 0.7–3.1)
LYMPHOCYTES NFR BLD AUTO: 23 %
Lab: NORMAL
MCH RBC QN AUTO: 24.8 PG (ref 26.6–33)
MCHC RBC AUTO-ENTMCNC: 31.5 G/DL (ref 31.5–35.7)
MCV RBC AUTO: 79 FL (ref 79–97)
METHYLMALONATE SERPL-SCNC: 70 NMOL/L (ref 0–378)
MONOCYTES # BLD AUTO: 0.5 X10E3/UL (ref 0.1–0.9)
MONOCYTES NFR BLD AUTO: 8 %
NEUTROPHILS # BLD AUTO: 4 X10E3/UL (ref 1.4–7)
NEUTROPHILS NFR BLD AUTO: 68 %
PLATELET # BLD AUTO: 186 X10E3/UL (ref 150–450)
POTASSIUM SERPL-SCNC: 4.2 MMOL/L (ref 3.5–5.2)
PREALB SERPL-MCNC: 23 MG/DL (ref 12–34)
PROT SERPL-MCNC: 6.6 G/DL (ref 6–8.5)
RBC # BLD AUTO: 4.64 X10E6/UL (ref 3.77–5.28)
SODIUM SERPL-SCNC: 140 MMOL/L (ref 134–144)
VIT B1 BLD-SCNC: 113.7 NMOL/L (ref 66.5–200)
WBC # BLD AUTO: 5.9 X10E3/UL (ref 3.4–10.8)

## 2023-06-01 ENCOUNTER — TELEPHONE (OUTPATIENT)
Dept: BARIATRICS/WEIGHT MGMT | Facility: CLINIC | Age: 43
End: 2023-06-01

## 2023-06-01 NOTE — TELEPHONE ENCOUNTER
Pt called, left SOILA stating that she has been having some recent issues and requested a call back.     I called pt back to get details. She stated that she recently had a Laparoscopic Total Hysterectomy (stated that they ended up not removing her ovaries) a little over 1 month ago at [facility].     She stated that ever since then she has become increasingly distended in her abdomen, has radiating pain from the middle/lower abdomen around to her back. She stated that the pain feels like a constant pressure on her pelvic area that is sharp and burns. She stated that she is severely uncomfortable .She stated that she looks around 8 months pregnant when she knows that she cannot be. She denies any fever or chills but does have intermitten nausea/vomiting. She has been taking 800mg Ibuprofen PRN Q 6-8 hours.     She stated that she was discharged on post op day #3. She has been to her GYN every week since the sx, she went to [facility] and [facility], they both did a CT scan of the abdomen/pelvis and came back normal.     She is concerned that this is due to her LSG she had in 2017 or if this could affect it. She was last seen in office on 09/24/21. Please advise, thanks!

## 2023-06-02 NOTE — TELEPHONE ENCOUNTER
Called pt, advised that there is low suspicion that this is LSG related and she is welcome to f/u in office for further eval but highly advise that she f/u with GYN and PCP for further eval following hysterectomy. Pt understood with no further questions or concerns, declined to make OV at this time.

## 2025-01-15 RX ORDER — DULOXETIN HYDROCHLORIDE 30 MG/1
30 CAPSULE, DELAYED RELEASE ORAL DAILY
COMMUNITY

## 2025-01-15 RX ORDER — OXCARBAZEPINE 150 MG/1
150 TABLET, FILM COATED ORAL 2 TIMES DAILY
COMMUNITY

## 2025-01-15 RX ORDER — PROMETHAZINE HYDROCHLORIDE 25 MG/1
25 TABLET ORAL AS NEEDED
COMMUNITY

## 2025-01-15 RX ORDER — ALPRAZOLAM 2 MG/1
2 TABLET ORAL AS NEEDED
COMMUNITY

## 2025-01-23 ENCOUNTER — DOCUMENTATION (OUTPATIENT)
Dept: BARIATRICS/WEIGHT MGMT | Facility: CLINIC | Age: 45
End: 2025-01-23
Payer: COMMERCIAL

## 2025-01-23 ENCOUNTER — OFFICE VISIT (OUTPATIENT)
Dept: BARIATRICS/WEIGHT MGMT | Facility: CLINIC | Age: 45
End: 2025-01-23
Payer: COMMERCIAL

## 2025-01-23 VITALS
SYSTOLIC BLOOD PRESSURE: 130 MMHG | HEIGHT: 65 IN | TEMPERATURE: 98 F | DIASTOLIC BLOOD PRESSURE: 84 MMHG | WEIGHT: 277.8 LBS | HEART RATE: 88 BPM | OXYGEN SATURATION: 97 % | RESPIRATION RATE: 18 BRPM | BODY MASS INDEX: 46.28 KG/M2

## 2025-01-23 DIAGNOSIS — R10.13 DYSPEPSIA: ICD-10-CM

## 2025-01-23 DIAGNOSIS — R11.10 CHRONIC VOMITING: Primary | ICD-10-CM

## 2025-01-23 NOTE — PROGRESS NOTES
"[facility] BARIATRIC SURGERY  [address]  [address]  [facility] [id number] [phone]      Patient  Name:  Hao Platt  :  1980      Date of Visit: 2025      Chief Complaint:  Revision Eval - unknown options      History of Present Illness:  Hao Platt is a 44 y.o. female pursuing revision metabolic and bariatric surgery with Dr. Barnhart.    s/p LSG 17 w/ Dr. Barnhart.  LOV 2021.  Noted 80lbs weight gain w/ COVID.  Reported stress eating.  Was working w/ a therapist for emotional eating.  Denied GI complaints.     Returns today to discuss ongoing issues.  Reports chronic issues since having WLS - says vomits \"almost daily\".  Doesn't matter what she eats.  Develops postprandial abd.pain + nausea as soon as she eats, then everything just comes right back up.  The vomiting has gotten \"so bad\" that she has had all of her teeth removed.  Zofran/Phenergan don't help.  Says \"something's got to change.\"  Was advised to return to bariatric surgeon.     Patient says she has done her own research.  Wants a \"conversion surgery\" to gastric bypass to hopefully alleviate her vomiting.     Also c/o uncontrolled acid reflux.  Aspirates 2-4x/week at night, wakes feeling like she is dying/choking on acid reflux.  Takes Omeprazole 20mg BID (opens capsule into her mouth, swallows w/ water).  Chews TUMS frequently.      Notes hx chronic constipation, always required an enema to have a BM, \"since forever\".  In the last year started drinking a beef colostrom daily and is now having stools daily.      Last EGD/CLN 2023 w/ Dr. Maxwell @[facility] - medium sliding HH noted, bilious fluid w/in stomach, gastritis, normal colon.  BX benign.      Does not follow w/ GI.  No recent UGI.  No prior GES.       Presurgery weight: 267 lbs.  Lowest weight: 160 lbs.  Current weight: 277 lbs w/ BMI 46.      Complete history has been obtained and discussed today, as " pertinent to metabolic/ bariatric surgery.     Past Medical History:   Diagnosis Date    Anxiety     Asthma     as a child    Back pain     Bipolar disorder     Depression     Fatigue     GERD (gastroesophageal reflux disease)     uncontrolled w/ BID PPI    Head injury     Insomnia     Iron deficiency     prn IV iron, last infusion 2024     (normal spontaneous vaginal delivery)     x 3, no issues    Peripheral edema     Rectal bleeding     had C-scope.  from hemorrhoids    Sleep apnea     suspected, never tested     Past Surgical History:   Procedure Laterality Date    BLADDER SUSPENSION      w/ repair of rectal/uterine prolapse, no mesh    COLONOSCOPY      ENDOSCOPY N/A 2017    Procedure: ESOPHAGOGASTRODUODENOSCOPY;  Surgeon: Rita Barnhart MD;  Location:  [facility] OR;  Service:     GASTRIC SLEEVE LAPAROSCOPIC N/A 2017    Procedure: GASTRIC SLEEVE LAPAROSCOPIC;  Surgeon: Rita Barnhart MD;  Location:  [facility] OR;  Service:     LAPAROSCOPIC APPENDECTOMY  2013    LAPAROSCOPIC CHOLECYSTECTOMY      no stones, suspected dysfunction    LAPAROSCOPIC HYSTERECTOMY      benign dz - ovaries intact    SINUS SURGERY  2012    X 2    TUBAL ABDOMINAL LIGATION  2017       Allergies   Allergen Reactions    Lamotrigine Rash       Current Outpatient Medications:     acyclovir (ZOVIRAX) 400 MG tablet, Take 1 tablet by mouth 2 (Two) Times a Day., Disp: , Rfl:     ALPRAZolam (XANAX) 2 MG tablet, Take 1 tablet by mouth As Needed for Anxiety., Disp: , Rfl:     DiphenhydrAMINE HCl (BENADRYL ALLERGY PO), Take 50 mg by mouth Every Night., Disp: , Rfl:     escitalopram (LEXAPRO) 20 MG tablet, Take 1 tablet by mouth Daily., Disp: , Rfl:     OLANZapine (zyPREXA) 7.5 MG tablet, Take 1 tablet by mouth Every Night., Disp: , Rfl:     omeprazole (priLOSEC) 20 MG capsule, Take 1 capsule by mouth 2 (Two) Times a Day., Disp: , Rfl:     OXcarbazepine (TRILEPTAL) 150 MG tablet, Take 1 tablet by mouth 2 (Two)  Times a Day., Disp: , Rfl:     spironolactone (ALDACTONE) 50 MG tablet, Take 2 tablets by mouth Daily., Disp: , Rfl:     zolpidem (AMBIEN) 10 MG tablet, Take 1 tablet by mouth Every Night., Disp: , Rfl:     promethazine (PHENERGAN) 25 MG tablet, Take 1 tablet by mouth As Needed for Nausea or Vomiting. (Patient not taking: Reported on 2025), Disp: , Rfl:     Social History     Socioeconomic History    Marital status:      Spouse name: Hector Platt    Number of children: 3    Years of education: College   Tobacco Use    Smoking status: Former     Current packs/day: 0.00     Types: Cigarettes     Start date:      Quit date:      Years since quittin.0    Smokeless tobacco: Never   Vaping Use    Vaping status: Never Used   Substance and Sexual Activity    Alcohol use: No    Drug use: No    Sexual activity: Defer     Birth control/protection: Surgical     Social History     Social History Narrative    Lives in [place], [place].   with three children.  Disabled since  d/t severe anxiety.        Family History   Problem Relation Age of Onset    Hypertension Mother     Cancer Father     Hypertension Sister     Hypertension Maternal Grandmother     Hypertension Maternal Grandfather     Hypertension Paternal Grandmother     Heart disease Paternal Grandmother        Review of Systems:  Constitutional:  reports fatigue, weight gain and denies fevers, chills.  HEENT:  denies headache, ear pain or loss of hearing, blurred or double vision, nasal discharge or sore throat.  Cardiovascular:  denies HTN, hx heart disease, hx DVT.  Respiratory:  reports sleep apnea and denies asthma, hx PE.  Gastrointestinal:  reports heartburn, nausea, vomiting, abdominal pain and denies dysphagia, liver disease.  Genitourinary:   denies history of  frequent UTI, incontinence, hematuria, dysuria, polyuria, polydipsia, renal insufficiency.    Musculoskeletal:  denies autoimmune disease.  Neurological:    denies seizure, stroke.  Psychiatric:  reports hx depression, hx anxiety, bipolar disorder .  Endocrine:  denies diabetes, thyroid disease.  Hematologic:  denies bruising, bleeding disorder, hx anemia, hx blood transfusion.  Skin:  denies rashes, hx MRSA.      Physical Exam:  Vital Signs:  Weight: 126 kg (277 lb 12.8 oz)   Body mass index is 46.23 kg/m².  Temp: 98 °F (36.7 °C)   Heart Rate: 88   BP: 130/84     Physical Exam  Vitals reviewed.   Constitutional:       Appearance: She is well-developed.   HENT:      Head: Normocephalic and atraumatic.   Eyes:      General: No scleral icterus.     Conjunctiva/sclera: Conjunctivae normal.   Neck:      Thyroid: No thyromegaly.   Cardiovascular:      Rate and Rhythm: Normal rate and regular rhythm.      Heart sounds: No murmur heard.  Pulmonary:      Effort: Pulmonary effort is normal. No respiratory distress.      Breath sounds: Normal breath sounds. No wheezing or rales.   Abdominal:      General: Bowel sounds are normal. There is no distension.      Palpations: Abdomen is soft. There is no mass.      Tenderness: There is no abdominal tenderness.      Hernia: Hernia: umbilical - small.      Comments: laparoscopic scars   Musculoskeletal:         General: Normal range of motion.      Cervical back: Neck supple.   Skin:     General: Skin is warm and dry.      Findings: No rash.   Neurological:      Mental Status: She is alert and oriented to person, place, and time.      Gait: Gait normal.   Psychiatric:         Judgment: Judgment normal.         Patient Active Problem List   Diagnosis    Snoring    Rectal bleeding    Peripheral edema     (normal spontaneous vaginal delivery)    Insomnia    Hip pain    Hiatal hernia    GERD (gastroesophageal reflux disease)    Fatigue    Depression    Bipolar disorder    Back pain    Asthma    Anxiety    Obesity, Class III, BMI 40-49.9 (morbid obesity)    Morbid obesity with BMI of 40.0-44.9, adult    Sleep apnea       Assessment:  44  y.o. female s/p LSG 12/12/17 w/ Dr. Barnhart, pursuing revision.      ICD-10-CM ICD-9-CM   1. Chronic vomiting  R11.10 787.03   2. Dyspepsia  R10.13 536.8         Plan:  Revision options unknown.  Will schedule UGI + EGD w/ Dr. Barnhart for further evaluation.  Additional input to follow.          ANA Zimmerman          I spent 60 minutes caring for Hao on this date of service. This time includes time spent by me in the following activities: preparing for the visit, reviewing tests, performing a medically appropriate examination and/or evaluation, referring and communicating with other health care professionals, documenting information in the medical record, and care coordination

## 2025-01-23 NOTE — PROGRESS NOTES
"Weight Loss Surgery  Presurgical Nutrition Assessment     Hao Platt  01/23/2025  [id number]  [id number]  1980   female    Surgery desired: Revision.  LSG  (Dr Barnhart)  on 12/12/2017     HEIGHT: 165.1 cm  (65\")   WEIGHT: 126 kg (277.5 #)   BMI: 46.23    Highest weight ever: 128 kg (282 #) - pre sleeve gastrectomy after which she lost to 160 #.  Regained during Covid and since.      Allergies   Allergen Reactions    Lamotrigine Rash       Current Outpatient Medications:     acyclovir (ZOVIRAX) 400 MG tablet, Take 1 tablet by mouth 2 (Two) Times a Day., Disp: , Rfl:     ALPRAZolam (XANAX) 2 MG tablet, Take 1 tablet by mouth As Needed for Anxiety., Disp: , Rfl:     DiphenhydrAMINE HCl (BENADRYL ALLERGY PO), Take 50 mg by mouth Every Night., Disp: , Rfl:     escitalopram (LEXAPRO) 20 MG tablet, Take 1 tablet by mouth Daily., Disp: , Rfl:     OLANZapine (zyPREXA) 7.5 MG tablet, Take 1 tablet by mouth Every Night., Disp: , Rfl:     omeprazole (priLOSEC) 20 MG capsule, Take 1 capsule by mouth 2 (Two) Times a Day., Disp: , Rfl:     omeprazole (PRILOSEC) 40 MG capsule, Take 1 capsule by mouth 2 (Two) Times a Day. (Patient not taking: Reported on 1/23/2025), Disp: 60 capsule, Rfl: 11    OXcarbazepine (TRILEPTAL) 150 MG tablet, Take 1 tablet by mouth 2 (Two) Times a Day., Disp: , Rfl:     promethazine (PHENERGAN) 25 MG tablet, Take 1 tablet by mouth As Needed for Nausea or Vomiting., Disp: , Rfl:     spironolactone (ALDACTONE) 50 MG tablet, Take 1 tablet by mouth 2 (two) times a day., Disp: , Rfl:     zolpidem (AMBIEN) 10 MG tablet, Take 1 tablet by mouth Every Night., Disp: , Rfl:       Subjective information: Nutrition consult with patient, accompanied by 16 yo daughter. Patient complained of vomiting after eating, any time she eats.  Notes presence of hiatal hernia and associated pain or discomfort.  Note that I consulted with patient on 08/24/2017 prior to bariatric surgery. At that time she weighed 258.5 # " and was eating 1 balanced meal per day which included mac'n'cheese. Patient reported on food history at that time that she followed her recorded meal with a sweet.  Currently, patient eats as recorded below and has similar mealtime patterns.  Patient states that because  of vomiting with food, she has lately often been able to tolerate only almonds with water.      Nutrition Recall   Example of Usual 24 hour intake:     Breakfast:  Rises at 12:30 to 1 am each day.  No food.  Drinks 1 cup coffee.    Lunch: /brunch at 11:00 am =  2 scrambled eggs with smoked sausage     Dinner: 4 piece baked chicken wih rice OR spaghetti with sauce OR steak    Snacks:Usually a Poptart OR cottage cheese with tomato or greek yogurt occasionally OR almonds    Beverages of Choice: water, 1 cup coffee per day    Food Allergies or Intolerances: no specific allergies stated or documented.  Patient states there are many foods she cannot tolerate due to nausea and vomiting.           Exercise / Activity: no current personal activity plan      Assessment of Nutritional Adequacy, Excessive Intake or Deficiencies:        Protein intake is insufficient to ensure successful weight loss.                                         Processed / simple Carbohydrate intake is: high - due to her desiring a sweet after meals, Pop Tart snacks,                                        Balance of diet with a variety of fruits and vegetables is: diet is unbalanced                                                        Reliance on restaurant food including fast food is: rare                                                                          Ingestion of sweet beverages eg soda, sweet tea, fruit juice: not an issue      Education    Provided Nutrition Guidelines for Bariatric and Metabolic Surgery   Reviewed guidelines for higher protein, limited carbohydrate diet to promote weight loss. Demonstrated means of counting protein and carbohydrate grams.   Educated  patient to wisely choose an appropriate protein supplement beverage for the post-surgery liquid diet.  Provided product guidelines and examples.    Explained importance of goal setting to help in changing eating behaviors that are not conducive to weight loss.  Specific macronutrient goals as below.   Provided follow-up options for support, including contact information for dietitians here.     Discussed importance of tracking grams of protein and carbohydrate in diet.  Web-based support information and apps for smart phones and computers given.        Nutrition Goals   Protein goal: 100 grams per day in three regular balanced meals and two to three high protein snacks each day, to ensure desired weight loss.   Carbohydrate goal:  100-140 grams per day  Beverage goal: Appropriate non-carbonated beverage intake.  Patient to wean self off of any sweet beverages, including soda.    Exercise Goals  Continue current exercise routine, if appropriate, and obtain approval from caregiver if physically limited for any reason.   Start activity plan per PCP/specialist advice if not currently exercising.     Recommend that team proceed with surgery and follow per protocol.   Rachelle Josue RD  01/23/2025  11:28 EST

## 2025-01-31 ENCOUNTER — PATIENT ROUNDING (BHMG ONLY) (OUTPATIENT)
Dept: BARIATRICS/WEIGHT MGMT | Facility: CLINIC | Age: 45
End: 2025-01-31
Payer: COMMERCIAL

## 2025-01-31 NOTE — PROGRESS NOTES
A [employer] message has been sent to the patient for PATIENT ROUNDING for [facility] - Bariatric Surgery/[facility] Medical Weight Mgmt.

## 2025-02-05 ENCOUNTER — HOSPITAL ENCOUNTER (OUTPATIENT)
Dept: GENERAL RADIOLOGY | Facility: HOSPITAL | Age: 45
Discharge: HOME | End: 2025-02-05
Admitting: PHYSICIAN ASSISTANT
Payer: COMMERCIAL

## 2025-02-05 DIAGNOSIS — R10.13 DYSPEPSIA: ICD-10-CM

## 2025-02-05 PROCEDURE — 74240 X-RAY XM UPR GI TRC 1CNTRST: CPT

## 2025-02-05 RX ADMIN — BARIUM SULFATE 183 ML: 960 POWDER, FOR SUSPENSION ORAL at 09:07

## 2025-06-20 ENCOUNTER — APPOINTMENT (OUTPATIENT)
Dept: CT IMAGING | Facility: HOSPITAL | Age: 45
End: 2025-06-20
Payer: COMMERCIAL

## 2025-06-20 ENCOUNTER — HOSPITAL ENCOUNTER (EMERGENCY)
Facility: HOSPITAL | Age: 45
Discharge: HOME | End: 2025-06-20
Attending: EMERGENCY MEDICINE
Payer: COMMERCIAL

## 2025-06-20 VITALS
WEIGHT: 260 LBS | TEMPERATURE: 98.6 F | OXYGEN SATURATION: 96 % | SYSTOLIC BLOOD PRESSURE: 118 MMHG | DIASTOLIC BLOOD PRESSURE: 99 MMHG | HEART RATE: 87 BPM | RESPIRATION RATE: 20 BRPM | HEIGHT: 65 IN | BODY MASS INDEX: 43.32 KG/M2

## 2025-06-20 DIAGNOSIS — A08.11 NOROVIRUS: ICD-10-CM

## 2025-06-20 DIAGNOSIS — A04.72 C. DIFFICILE COLITIS: ICD-10-CM

## 2025-06-20 DIAGNOSIS — R10.84 GENERALIZED ABDOMINAL PAIN: Primary | ICD-10-CM

## 2025-06-20 DIAGNOSIS — A09 DIARRHEA OF INFECTIOUS ORIGIN: ICD-10-CM

## 2025-06-20 LAB
ADV 40+41 DNA STL QL NAA+NON-PROBE: NOT DETECTED
ALBUMIN SERPL-MCNC: 4.1 G/DL (ref 3.5–5.2)
ALBUMIN/GLOB SERPL: 1.4 G/DL
ALP SERPL-CCNC: 111 U/L (ref 39–117)
ALT SERPL W P-5'-P-CCNC: 25 U/L (ref 1–33)
ANION GAP SERPL CALCULATED.3IONS-SCNC: 11 MMOL/L (ref 5–15)
AST SERPL-CCNC: 17 U/L (ref 1–32)
ASTRO TYP 1-8 RNA STL QL NAA+NON-PROBE: NOT DETECTED
BASOPHILS # BLD AUTO: 0.04 10*3/MM3 (ref 0–0.2)
BASOPHILS NFR BLD AUTO: 0.3 % (ref 0–1.5)
BILIRUB SERPL-MCNC: 0.5 MG/DL (ref 0–1.2)
BUN SERPL-MCNC: 8.2 MG/DL (ref 6–20)
BUN/CREAT SERPL: 9.1 (ref 7–25)
C CAYETANENSIS DNA STL QL NAA+NON-PROBE: NOT DETECTED
C COLI+JEJ+UPSA DNA STL QL NAA+NON-PROBE: NOT DETECTED
C DIFF GDH + TOXINS A+B STL QL IA.RAPID: POSITIVE
C DIFF TOX GENS STL QL NAA+PROBE: DETECTED
CALCIUM SPEC-SCNC: 9.3 MG/DL (ref 8.6–10.5)
CHLORIDE SERPL-SCNC: 103 MMOL/L (ref 98–107)
CO2 SERPL-SCNC: 23 MMOL/L (ref 22–29)
CREAT SERPL-MCNC: 0.9 MG/DL (ref 0.57–1)
CRP SERPL-MCNC: 4.32 MG/DL (ref 0–0.5)
CRYPTOSP DNA STL QL NAA+NON-PROBE: NOT DETECTED
D-LACTATE SERPL-SCNC: 1.4 MMOL/L (ref 0.5–2)
DEPRECATED RDW RBC AUTO: 38.9 FL (ref 37–54)
E HISTOLYT DNA STL QL NAA+NON-PROBE: NOT DETECTED
EAEC PAA PLAS AGGR+AATA ST NAA+NON-PRB: NOT DETECTED
EC STX1+STX2 GENES STL QL NAA+NON-PROBE: NOT DETECTED
EGFRCR SERPLBLD CKD-EPI 2021: 81 ML/MIN/1.73
EOSINOPHIL # BLD AUTO: 0.01 10*3/MM3 (ref 0–0.4)
EOSINOPHIL NFR BLD AUTO: 0.1 % (ref 0.3–6.2)
EPEC EAE GENE STL QL NAA+NON-PROBE: NOT DETECTED
ERYTHROCYTE [DISTWIDTH] IN BLOOD BY AUTOMATED COUNT: 13 % (ref 12.3–15.4)
ETEC LTA+ST1A+ST1B TOX ST NAA+NON-PROBE: NOT DETECTED
G LAMBLIA DNA STL QL NAA+NON-PROBE: NOT DETECTED
GLOBULIN UR ELPH-MCNC: 2.9 GM/DL
GLUCOSE SERPL-MCNC: 101 MG/DL (ref 65–99)
HCG INTACT+B SERPL-ACNC: <0.1 MIU/ML
HCT VFR BLD AUTO: 41.4 % (ref 34–46.6)
HGB BLD-MCNC: 13.9 G/DL (ref 12–15.9)
IMM GRANULOCYTES # BLD AUTO: 0.05 10*3/MM3 (ref 0–0.05)
IMM GRANULOCYTES NFR BLD AUTO: 0.3 % (ref 0–0.5)
LIPASE SERPL-CCNC: 20 U/L (ref 13–60)
LYMPHOCYTES # BLD AUTO: 2.01 10*3/MM3 (ref 0.7–3.1)
LYMPHOCYTES NFR BLD AUTO: 13.9 % (ref 19.6–45.3)
MCH RBC QN AUTO: 27.9 PG (ref 26.6–33)
MCHC RBC AUTO-ENTMCNC: 33.6 G/DL (ref 31.5–35.7)
MCV RBC AUTO: 83.1 FL (ref 79–97)
MONOCYTES # BLD AUTO: 1.35 10*3/MM3 (ref 0.1–0.9)
MONOCYTES NFR BLD AUTO: 9.3 % (ref 5–12)
NEUTROPHILS NFR BLD AUTO: 10.98 10*3/MM3 (ref 1.7–7)
NEUTROPHILS NFR BLD AUTO: 76.1 % (ref 42.7–76)
NOROVIRUS GI+II RNA STL QL NAA+NON-PROBE: DETECTED
NRBC BLD AUTO-RTO: 0 /100 WBC (ref 0–0.2)
P SHIGELLOIDES DNA STL QL NAA+NON-PROBE: NOT DETECTED
PLATELET # BLD AUTO: 176 10*3/MM3 (ref 140–450)
PMV BLD AUTO: 9.6 FL (ref 6–12)
POTASSIUM SERPL-SCNC: 4.4 MMOL/L (ref 3.5–5.2)
PROCALCITONIN SERPL-MCNC: 0.06 NG/ML (ref 0–0.25)
PROT SERPL-MCNC: 7 G/DL (ref 6–8.5)
RBC # BLD AUTO: 4.98 10*6/MM3 (ref 3.77–5.28)
RVA RNA STL QL NAA+NON-PROBE: NOT DETECTED
S ENT+BONG DNA STL QL NAA+NON-PROBE: NOT DETECTED
SAPO I+II+IV+V RNA STL QL NAA+NON-PROBE: NOT DETECTED
SHIGELLA SP+EIEC IPAH ST NAA+NON-PROBE: NOT DETECTED
SODIUM SERPL-SCNC: 137 MMOL/L (ref 136–145)
V CHOL+PARA+VUL DNA STL QL NAA+NON-PROBE: NOT DETECTED
V CHOLERAE DNA STL QL NAA+NON-PROBE: NOT DETECTED
WBC NRBC COR # BLD AUTO: 14.44 10*3/MM3 (ref 3.4–10.8)
Y ENTEROCOL DNA STL QL NAA+NON-PROBE: NOT DETECTED

## 2025-06-20 PROCEDURE — 25010000002 MORPHINE PER 10 MG: Performed by: EMERGENCY MEDICINE

## 2025-06-20 PROCEDURE — 96376 TX/PRO/DX INJ SAME DRUG ADON: CPT

## 2025-06-20 PROCEDURE — 87507 IADNA-DNA/RNA PROBE TQ 12-25: CPT | Performed by: NURSE PRACTITIONER

## 2025-06-20 PROCEDURE — 99285 EMERGENCY DEPT VISIT HI MDM: CPT

## 2025-06-20 PROCEDURE — 87449 NOS EACH ORGANISM AG IA: CPT | Performed by: NURSE PRACTITIONER

## 2025-06-20 PROCEDURE — 84145 PROCALCITONIN (PCT): CPT | Performed by: NURSE PRACTITIONER

## 2025-06-20 PROCEDURE — 96374 THER/PROPH/DIAG INJ IV PUSH: CPT

## 2025-06-20 PROCEDURE — 86140 C-REACTIVE PROTEIN: CPT | Performed by: NURSE PRACTITIONER

## 2025-06-20 PROCEDURE — 80053 COMPREHEN METABOLIC PANEL: CPT | Performed by: NURSE PRACTITIONER

## 2025-06-20 PROCEDURE — 87493 C DIFF AMPLIFIED PROBE: CPT | Performed by: NURSE PRACTITIONER

## 2025-06-20 PROCEDURE — 25810000003 SODIUM CHLORIDE 0.9 % SOLUTION: Performed by: NURSE PRACTITIONER

## 2025-06-20 PROCEDURE — 84702 CHORIONIC GONADOTROPIN TEST: CPT | Performed by: NURSE PRACTITIONER

## 2025-06-20 PROCEDURE — 25010000002 FAMOTIDINE 10 MG/ML SOLUTION: Performed by: NURSE PRACTITIONER

## 2025-06-20 PROCEDURE — 83605 ASSAY OF LACTIC ACID: CPT | Performed by: NURSE PRACTITIONER

## 2025-06-20 PROCEDURE — 25510000001 IOPAMIDOL 61 % SOLUTION: Performed by: EMERGENCY MEDICINE

## 2025-06-20 PROCEDURE — 83690 ASSAY OF LIPASE: CPT | Performed by: NURSE PRACTITIONER

## 2025-06-20 PROCEDURE — 96361 HYDRATE IV INFUSION ADD-ON: CPT

## 2025-06-20 PROCEDURE — 74177 CT ABD & PELVIS W/CONTRAST: CPT

## 2025-06-20 PROCEDURE — 96375 TX/PRO/DX INJ NEW DRUG ADDON: CPT

## 2025-06-20 PROCEDURE — 85025 COMPLETE CBC W/AUTO DIFF WBC: CPT | Performed by: NURSE PRACTITIONER

## 2025-06-20 RX ORDER — MORPHINE SULFATE 4 MG/ML
4 INJECTION, SOLUTION INTRAMUSCULAR; INTRAVENOUS ONCE
Status: COMPLETED | OUTPATIENT
Start: 2025-06-20 | End: 2025-06-20

## 2025-06-20 RX ORDER — SODIUM CHLORIDE 0.9 % (FLUSH) 0.9 %
10 SYRINGE (ML) INJECTION AS NEEDED
Status: DISCONTINUED | OUTPATIENT
Start: 2025-06-20 | End: 2025-06-20 | Stop reason: HOSPADM

## 2025-06-20 RX ORDER — FAMOTIDINE 10 MG/ML
20 INJECTION, SOLUTION INTRAVENOUS ONCE
Status: COMPLETED | OUTPATIENT
Start: 2025-06-20 | End: 2025-06-20

## 2025-06-20 RX ORDER — ONDANSETRON 4 MG/1
4 TABLET, FILM COATED ORAL EVERY 8 HOURS PRN
Qty: 20 TABLET | Refills: 0 | Status: SHIPPED | OUTPATIENT
Start: 2025-06-20 | End: 2025-06-20

## 2025-06-20 RX ORDER — VANCOMYCIN HYDROCHLORIDE 125 MG/1
125 CAPSULE ORAL 4 TIMES DAILY
Qty: 40 CAPSULE | Refills: 0 | Status: SHIPPED | OUTPATIENT
Start: 2025-06-20 | End: 2025-06-30

## 2025-06-20 RX ORDER — HYDROCODONE BITARTRATE AND ACETAMINOPHEN 5; 325 MG/1; MG/1
1 TABLET ORAL EVERY 8 HOURS PRN
Qty: 6 TABLET | Refills: 0 | Status: SHIPPED | OUTPATIENT
Start: 2025-06-20 | End: 2025-06-25 | Stop reason: HOSPADM

## 2025-06-20 RX ORDER — ONDANSETRON 4 MG/1
4 TABLET, FILM COATED ORAL EVERY 8 HOURS PRN
Qty: 15 TABLET | Refills: 0 | Status: SHIPPED | OUTPATIENT
Start: 2025-06-20 | End: 2025-06-25

## 2025-06-20 RX ORDER — VANCOMYCIN HYDROCHLORIDE 125 MG/1
125 CAPSULE ORAL ONCE
Status: COMPLETED | OUTPATIENT
Start: 2025-06-20 | End: 2025-06-20

## 2025-06-20 RX ORDER — IOPAMIDOL 612 MG/ML
85 INJECTION, SOLUTION INTRAVASCULAR
Status: COMPLETED | OUTPATIENT
Start: 2025-06-20 | End: 2025-06-20

## 2025-06-20 RX ADMIN — MORPHINE SULFATE 4 MG: 4 INJECTION, SOLUTION INTRAMUSCULAR; INTRAVENOUS at 11:10

## 2025-06-20 RX ADMIN — FAMOTIDINE 20 MG: 10 INJECTION, SOLUTION INTRAVENOUS at 10:57

## 2025-06-20 RX ADMIN — SODIUM CHLORIDE 1000 ML: 9 INJECTION, SOLUTION INTRAVENOUS at 10:57

## 2025-06-20 RX ADMIN — MORPHINE SULFATE 4 MG: 4 INJECTION, SOLUTION INTRAMUSCULAR; INTRAVENOUS at 12:41

## 2025-06-20 RX ADMIN — MORPHINE SULFATE 4 MG: 4 INJECTION, SOLUTION INTRAMUSCULAR; INTRAVENOUS at 13:53

## 2025-06-20 RX ADMIN — IOPAMIDOL 85 ML: 612 INJECTION, SOLUTION INTRAVENOUS at 11:25

## 2025-06-20 RX ADMIN — VANCOMYCIN HYDROCHLORIDE 125 MG: 125 CAPSULE ORAL at 13:55

## 2025-06-20 NOTE — Clinical Note
[facility] EMERGENCY DEPARTMENT  [address]  [facility] [address]  Phone: [phone]    Hao Platt was seen and treated in our emergency department on 6/20/2025.  She may return to work on 06/25/2025.         Thank you for choosing [facility].    Lavelle Chen MD

## 2025-06-20 NOTE — Clinical Note
[facility] EMERGENCY DEPARTMENT  [address]  [facility] [address]  Phone: [phone]    Hao Platt was seen and treated in our emergency department on 6/20/2025.  She may return to work on 06/25/2025.         Thank you for choosing [facility].    Lavelle Chen MD

## 2025-06-20 NOTE — Clinical Note
[facility] EMERGENCY DEPARTMENT  [address]  [facility] [address]  Phone: [phone]    Hao Platt was seen and treated in our emergency department on 6/20/2025.  She may return to work on 06/25/2025.         Thank you for choosing [facility].    Lavelle Chen MD

## 2025-06-20 NOTE — ED PROVIDER NOTES
EMERGENCY DEPARTMENT ENCOUNTER    Pt Name: Hao Platt  MRN: [id number]  Pt :   1980  Room Number:  24SF/24  Date of encounter:  2025  PCP: Duy Baltazar MD  ED Provider: JANE Randall    Historian: patient      HPI:  Chief Complaint: diarrhea, abdominal pain        Context: Hao Platt is a 44 y.o. female with history of LSG 17 with Dr. Barnhart, EGD [address] [facility] who presents to the ED c/o abdominal pain and diarrhea.  Reports frequent episodes of yellow stool since 1 month ago.  Episodes reoccur every other day.  This morning she woke up with abdominal pain that she describes as generalized and 8-9 episodes of liquid yellow stools.  No nausea or vomiting, no measurable fever at home but gets chilled occasionally.  She tried antidiarrheal and anti-inflammatory without relief of the symptoms.  Denies recent travel or antibiotic use.      PAST MEDICAL HISTORY  Past Medical History:   Diagnosis Date    Anxiety     Asthma     as a child    Back pain     Bipolar disorder     Depression     Fatigue     GERD (gastroesophageal reflux disease)     uncontrolled w/ BID PPI    Head injury     Insomnia     Iron deficiency     prn IV iron, last infusion 2024     (normal spontaneous vaginal delivery)     x 3, no issues    Peripheral edema     Rectal bleeding     had C-scope.  from hemorrhoids    Sleep apnea     suspected, never tested         PAST SURGICAL HISTORY  Past Surgical History:   Procedure Laterality Date    BLADDER SUSPENSION      w/ repair of rectal/uterine prolapse, no mesh    COLONOSCOPY      ENDOSCOPY N/A 2017    Procedure: ESOPHAGOGASTRODUODENOSCOPY;  Surgeon: Rita Barnhart MD;  Location:  [facility];  Service:     GASTRIC SLEEVE LAPAROSCOPIC N/A 2017    Procedure: GASTRIC SLEEVE LAPAROSCOPIC;  Surgeon: Rita aBrnhart MD;  Location:  [facility];  Service:     LAPAROSCOPIC APPENDECTOMY      LAPAROSCOPIC CHOLECYSTECTOMY       no stones, suspected dysfunction    LAPAROSCOPIC HYSTERECTOMY      benign dz - ovaries intact    SINUS SURGERY  2012    X 2    TUBAL ABDOMINAL LIGATION  2017         FAMILY HISTORY  Family History   Problem Relation Age of Onset    Hypertension Mother     Cancer Father     Hypertension Sister     Hypertension Maternal Grandmother     Hypertension Maternal Grandfather     Hypertension Paternal Grandmother     Heart disease Paternal Grandmother          SOCIAL HISTORY  Social History     Socioeconomic History    Marital status:      Spouse name: Hector Platt    Number of children: 3    Years of education: College   Tobacco Use    Smoking status: Former     Current packs/day: 0.00     Types: Cigarettes     Start date:      Quit date:      Years since quittin.4    Smokeless tobacco: Never   Vaping Use    Vaping status: Never Used   Substance and Sexual Activity    Alcohol use: No    Drug use: No    Sexual activity: Defer     Birth control/protection: Surgical         ALLERGIES  Lamotrigine        REVIEW OF SYSTEMS  Review of Systems       All systems reviewed and negative except for those discussed in HPI.       PHYSICAL EXAM    I have reviewed the triage vital signs and nursing notes.    ED Triage Vitals   Temp Heart Rate Resp BP SpO2   25 1005 25 1005 25 1005 25 1009 25 1005   98.6 °F (37 °C) 87 20 118/99 96 %      Temp src Heart Rate Source Patient Position BP Location FiO2 (%)   25 1005 25 1005 25 1005 25 1005 --   Oral Monitor Sitting Right arm        Physical Exam  GENERAL:   Appears in no acute distress.  Obese, tearful  HENT: Nares patent.  EYES: No scleral icterus.  CV: Regular rhythm, regular rate.  RESPIRATORY: Normal effort.  No audible wheezes, rales or rhonchi.  ABDOMEN: Soft, protuberant, left mid abdomen tenderness on palpation without rebound or peritoneal signs  MUSCULOSKELETAL: No deformities.   NEURO: Alert,  moves all extremities, follows commands.  SKIN: Warm, dry, no rash visualized.      LAB RESULTS  Recent Results (from the past 24 hours)   Comprehensive Metabolic Panel    Collection Time: 06/20/25 10:38 AM    Specimen: Blood   Result Value Ref Range    Glucose 101 (H) 65 - 99 mg/dL    BUN 8.2 6.0 - 20.0 mg/dL    Creatinine 0.90 0.57 - 1.00 mg/dL    Sodium 137 136 - 145 mmol/L    Potassium 4.4 3.5 - 5.2 mmol/L    Chloride 103 98 - 107 mmol/L    CO2 23.0 22.0 - 29.0 mmol/L    Calcium 9.3 8.6 - 10.5 mg/dL    Total Protein 7.0 6.0 - 8.5 g/dL    Albumin 4.1 3.5 - 5.2 g/dL    ALT (SGPT) 25 1 - 33 U/L    AST (SGOT) 17 1 - 32 U/L    Alkaline Phosphatase 111 39 - 117 U/L    Total Bilirubin 0.5 0.0 - 1.2 mg/dL    Globulin 2.9 gm/dL    A/G Ratio 1.4 g/dL    BUN/Creatinine Ratio 9.1 7.0 - 25.0    Anion Gap 11.0 5.0 - 15.0 mmol/L    eGFR 81.0 >60.0 mL/min/1.73   Lipase    Collection Time: 06/20/25 10:38 AM    Specimen: Blood   Result Value Ref Range    Lipase 20 13 - 60 U/L   Lactic Acid, Plasma    Collection Time: 06/20/25 10:38 AM    Specimen: Blood   Result Value Ref Range    Lactate 1.4 0.5 - 2.0 mmol/L   Procalcitonin    Collection Time: 06/20/25 10:38 AM    Specimen: Blood   Result Value Ref Range    Procalcitonin 0.06 0.00 - 0.25 ng/mL   C-reactive Protein    Collection Time: 06/20/25 10:38 AM    Specimen: Blood   Result Value Ref Range    C-Reactive Protein 4.32 (H) 0.00 - 0.50 mg/dL   CBC Auto Differential    Collection Time: 06/20/25 10:38 AM    Specimen: Blood   Result Value Ref Range    WBC 14.44 (H) 3.40 - 10.80 10*3/mm3    RBC 4.98 3.77 - 5.28 10*6/mm3    Hemoglobin 13.9 12.0 - 15.9 g/dL    Hematocrit 41.4 34.0 - 46.6 %    MCV 83.1 79.0 - 97.0 fL    MCH 27.9 26.6 - 33.0 pg    MCHC 33.6 31.5 - 35.7 g/dL    RDW 13.0 12.3 - 15.4 %    RDW-SD 38.9 37.0 - 54.0 fl    MPV 9.6 6.0 - 12.0 fL    Platelets 176 140 - 450 10*3/mm3    Neutrophil % 76.1 (H) 42.7 - 76.0 %    Lymphocyte % 13.9 (L) 19.6 - 45.3 %    Monocyte % 9.3  5.0 - 12.0 %    Eosinophil % 0.1 (L) 0.3 - 6.2 %    Basophil % 0.3 0.0 - 1.5 %    Immature Grans % 0.3 0.0 - 0.5 %    Neutrophils, Absolute 10.98 (H) 1.70 - 7.00 10*3/mm3    Lymphocytes, Absolute 2.01 0.70 - 3.10 10*3/mm3    Monocytes, Absolute 1.35 (H) 0.10 - 0.90 10*3/mm3    Eosinophils, Absolute 0.01 0.00 - 0.40 10*3/mm3    Basophils, Absolute 0.04 0.00 - 0.20 10*3/mm3    Immature Grans, Absolute 0.05 0.00 - 0.05 10*3/mm3    nRBC 0.0 0.0 - 0.2 /100 WBC   hCG, Quantitative, Pregnancy    Collection Time: 06/20/25 10:38 AM    Specimen: Blood   Result Value Ref Range    HCG Quantitative <0.10 mIU/mL   Gastrointestinal Panel, PCR - Stool, Per Rectum    Collection Time: 06/20/25 10:39 AM    Specimen: Per Rectum; Stool   Result Value Ref Range    Campylobacter Not Detected Not Detected    Plesiomonas shigelloides Not Detected Not Detected    Salmonella Not Detected Not Detected    Vibrio Not Detected Not Detected    Vibrio cholerae Not Detected Not Detected    Yersinia enterocolitica Not Detected Not Detected    Enteroaggregative E. coli (EAEC) Not Detected Not Detected    Enteropathogenic E. coli (EPEC) Not Detected Not Detected    Enterotoxigenic E. coli (ETEC) lt/st Not Detected Not Detected    Shiga-like toxin-producing E. coli (STEC) stx1/stx2 Not Detected Not Detected    Shigella/Enteroinvasive E. coli (EIEC) Not Detected Not Detected    Cryptosporidium Not Detected Not Detected    Cyclospora cayetanensis Not Detected Not Detected    Entamoeba histolytica Not Detected Not Detected    Giardia lamblia Not Detected Not Detected    Adenovirus F40/41 Not Detected Not Detected    Astrovirus Not Detected Not Detected    Norovirus GI/GII Detected (A) Not Detected    Rotavirus A Not Detected Not Detected    Sapovirus (I, II, IV or V) Not Detected Not Detected   Clostridioides difficile Toxin, PCR - Stool, Per Rectum    Collection Time: 06/20/25 10:39 AM    Specimen: Per Rectum; Stool   Result Value Ref Range    Toxigenic  C. difficile by PCR Detected (A) Not Detected   Clostridioides difficile toxin Ag, Reflex - Stool, Per Rectum    Collection Time: 06/20/25 10:39 AM    Specimen: Per Rectum; Stool   Result Value Ref Range    C.diff Toxin Ag Positive (A) Negative       If labs were ordered, I independently reviewed the results and considered them in treating the patient.        RADIOLOGY  CT Abdomen Pelvis With Contrast  Result Date: 6/20/2025  CT ABDOMEN PELVIS W CONTRAST Date of Exam: 6/20/2025 11:21 AM EDT Indication: abd pain, diarrhea, Hx of gastic sleeve. Comparison: None available. Technique: Axial CT images were obtained of the abdomen and pelvis following the uneventful intravenous administration of 85 cc Isovue-300. Reconstructed coronal and sagittal images were also obtained. Automated exposure control and iterative construction methods were used. Findings: Visualized Chest:  The visualized lung bases and lower mediastinal structures are unremarkable. Liver: Liver is normal in size and CT density. No focal lesions. Gallbladder: Status post cholecystectomy Bile Ducts: No billiary dcutal dilation. Spleen: Spleen is normal in size and CT density. Pancreas: Pancreas is normal. There is no evidence of pancreatic mass or peripancreatic fluid. Adrenals: Adrenal glands are unremarkable. Kidneys: Kidneys are normal in size. There are no stones or hydronephrosis. Gastrointestinal: Status post gastric sleeve procedure. Mild wall thickening of the sigmoid colon. There is also liquid stool noted throughout the colon. Otherwise the visualized GI tract is unremarkable Bladder: The bladder is normal. Pelvis:  No suspecious mass. Peritoneum/Mesentery: No fluid collection, ascities, or free air.   Lymph Nodes: No lymphadenopathy. Vasculature: Unremarkable Abdominal Wall: Unremarkable Bony Structures: No acute osseous abnormality     Impression: 1.Mild wall thickening of the sigmoid colon. There is also liquid stool noted throughout the  colon. These findings are suggestive of a nonspecific colitis. 2.Status post gastric sleeve procedure. 3.Status post cholecystectomy. Electronically Signed: Parth Nurapino   6/20/2025 11:48 AM EDT  Workstation ID: [id number]      I ordered and independently reviewed the above noted radiographic studies.      I viewed images of CT abdomen pelvis which showed no bowel obstruction per my independent interpretation.    See radiologist's dictation for official interpretation.        PROCEDURES    Procedures    No orders to display       MEDICATIONS GIVEN IN ER    Medications   sodium chloride 0.9 % flush 10 mL (has no administration in time range)   Pharmacy Consult (has no administration in time range)   sodium chloride 0.9 % bolus 1,000 mL (0 mL Intravenous Stopped 6/20/25 1240)   famotidine (PEPCID) injection 20 mg (20 mg Intravenous Given 6/20/25 1057)   Morphine sulfate (PF) injection 4 mg (4 mg Intravenous Given 6/20/25 1110)   iopamidol (ISOVUE-300) 61 % injection 85 mL (85 mL Intravenous Given 6/20/25 1125)   Morphine sulfate (PF) injection 4 mg (4 mg Intravenous Given 6/20/25 1241)   vancomycin (VANCOCIN) capsule 125 mg (125 mg Oral Given 6/20/25 1355)   Morphine sulfate (PF) injection 4 mg (4 mg Intravenous Given 6/20/25 1353)         MEDICAL DECISION MAKING, PROGRESS, and CONSULTS    All labs, if obtained, have been independently reviewed by me.  All radiology studies, if obtained, have been reviewed by me and the radiologist dictating the report.  All EKG's, if obtained, have been independently viewed and interpreted by me/my attending physician.      Discussion below represents my analysis of pertinent findings related to patient's condition, differential diagnosis, treatment plan and final disposition.  Patient is a 44-year-old female with history of gastric sleeve presented for evaluation of abdominal pain and diarrhea.  States symptoms onset was 1 month ago.  On physical exam she was  nontoxic-appearing, abdomen was soft, mildly tender to palpation left mid region, no peritoneal signs or rebound.  Lab work significant for leukocytosis 14.44, normal procalcitonin and lactate, stool panel positive for C. difficile toxin and norovirus, CT abdomen pelvis shows mild wall thickening of sigmoid colon, liquid stool throughout the colon, nonspecific colitis.  Patient was taking Augmentin for sinus infection 2 weeks ago that incited C. difficile.  Patient received fluids and pain control with good result, her vital signs were stable, no fever, hypotension or tachycardia, she is young, nontoxic-appearing, no indication for admission today.  Discharged home with p.o. vancomycin, advised GI follow-up, increase fluid intake, return precautions discussed.                       Differential diagnosis:    Colitis, diverticulitis, infectious diarrhea, dehydration, bowel obstruction, electrolyte disbalance      Additional sources:    - Discussed/ obtained information from independent historians: Spouse    - External (non-ED) record review: Progress note by Nat Howe 6/20//2025    - Chronic or social conditions impacting care: Recent antibiotic use, gastric sleeve    - Shared decision making: Patient, spouse      Orders placed during this visit:  Orders Placed This Encounter   Procedures    Gastrointestinal Panel, PCR - Stool, Per Rectum    Clostridioides difficile Toxin - Stool, Per Rectum    Clostridioides difficile Toxin, PCR - Stool, Per Rectum    Clostridioides difficile toxin Ag, Reflex - Stool,    CT Abdomen Pelvis With Contrast    Comprehensive Metabolic Panel    Lipase    Lactic Acid, Plasma    Procalcitonin    C-reactive Protein    CBC Auto Differential    hCG, Quantitative, Pregnancy    Ambulatory Referral to Gastroenterology    Patient Isolation Contact Spore    Insert Peripheral IV    CBC & Differential         Additional orders considered but not ordered:  uds    ED Course:    Consultants:      ED  Course as of 06/20/25 1501   Fri Jun 20, 2025   1059 TH notified me that patient arrived to ER via EMS, she was seen at primary care office for 3 months follow-up.  Was complaining of yellow diarrhea since 3 to 4 weeks ago and abdominal pain.  She also endorsed recent suicidal ideation with the plan at the office.  Had plan to take sleeping medicine.  When I questioned patient of suicidal thoughts or ideations she told me that indeed she occasionally has suicidal thoughts but not at this time, she has no intentions to hurt herself or others. [IR]   1116 WBC(!): 14.44 [IR]   1215 Patient received morphine for pain with good temporary relief of her symptoms.  She called out stating the pain had returned.  Will provide another dose of pain control [IR]   1300 Discussed with patient lab work and imaging result.  C. difficile toxin and norovirus on GI panel.  CT shows mild thickening of sigmoid colon, suggestive of nonspecific colitis.  Planning for discharge with p.o. vancomycin and symptomatic treatment.  Spouse is at bedside, he recalled that patient was taking antibiotics for sinus infection recently [IR]   1345 I reviewed the patient's medications, she was prescribed Augmentin on 6/3/2025  [IR]   1345 Patient asked for a few pain pills to go home with, concerned that during upcoming weekend she will not able to control her pain. [IR]      ED Course User Index  [IR] Mandy Boothe, APRN              Shared Decision Making:  After my consideration of clinical presentation and any laboratory/radiology studies obtained, I discussed the findings with the patient/patient representative who is in agreement with the treatment plan and the final disposition.   Risks and benefits of discharge and/or observation/admission were discussed.       AS OF 15:01 EDT VITALS:    BP - 118/99  HR - 87  TEMP - 98.6 °F (37 °C) (Oral)  O2 SATS - 96%                  DIAGNOSIS  Final diagnoses:   Generalized abdominal pain   C. difficile  colitis   Diarrhea of infectious origin   Norovirus         DISPOSITION  DISCHARGE    Patient discharged in stable condition.    Reviewed implications of results, diagnosis, meds, responsibility to follow up, warning signs and symptoms of possible worsening, potential complications and reasons to return to ER.    Patient/Family voiced understanding of above instructions.    Discussed plan for discharge, as there is no emergent indication for admission.  Pt/family is agreeable and understands need for follow up and possible repeat testing.  Pt/family is aware that discharge does not mean that nothing is wrong but that it indicates no emergency is currently present that requires admission and they must continue care with follow-up as given below or with a physician of their choice.     FOLLOW-UP  [facility] MEDICAL [facility] GASTROENTEROLOGY  [address]  [address]  [facility] [address] [phone]        [facility] EMERGENCY DEPARTMENT  [address] [address] [phone]             Medication List        New Prescriptions      HYDROcodone-acetaminophen 5-325 MG per tablet  Commonly known as: NORCO  Take 1 tablet by mouth Every 8 (Eight) Hours As Needed for Severe Pain for up to 2 days.     ondansetron 4 MG tablet  Commonly known as: ZOFRAN  Take 1 tablet by mouth Every 8 (Eight) Hours As Needed for Nausea or Vomiting for up to 5 days.     vancomycin 125 MG capsule  Commonly known as: VANCOCIN  Take 1 capsule by mouth 4 (Four) Times a Day for 10 days.               Where to Get Your Medications        These medications were sent to [facility]  [address] [place], [place] [id number], [place] [address]      Hours: Monday to Friday 7 AM to 5:30 PM, Saturday & Sunday 8 AM to 4:30 PM Phone: [phone]   HYDROcodone-acetaminophen 5-325 MG per tablet  ondansetron 4 MG tablet  vancomycin 125 MG capsule            Please note that  portions of this document were completed with voice recognition software.     JANE Randall   06/20/25   15:01 Mandy Cabrera, JANE  06/20/25 1507

## 2025-06-23 ENCOUNTER — APPOINTMENT (OUTPATIENT)
Dept: CT IMAGING | Facility: HOSPITAL | Age: 45
End: 2025-06-23
Payer: COMMERCIAL

## 2025-06-23 ENCOUNTER — HOSPITAL ENCOUNTER (OUTPATIENT)
Facility: HOSPITAL | Age: 45
Setting detail: OBSERVATION
Discharge: HOME | End: 2025-06-25
Attending: EMERGENCY MEDICINE | Admitting: STUDENT IN AN ORGANIZED HEALTH CARE EDUCATION/TRAINING PROGRAM
Payer: COMMERCIAL

## 2025-06-23 DIAGNOSIS — A04.72 C. DIFFICILE COLITIS: ICD-10-CM

## 2025-06-23 DIAGNOSIS — R10.9 INTRACTABLE ABDOMINAL PAIN: Primary | ICD-10-CM

## 2025-06-23 DIAGNOSIS — A08.11 NOROVIRUS: ICD-10-CM

## 2025-06-23 PROBLEM — E86.0 DEHYDRATION: Status: ACTIVE | Noted: 2025-06-23

## 2025-06-23 LAB
ALBUMIN SERPL-MCNC: 4.2 G/DL (ref 3.5–5.2)
ALBUMIN/GLOB SERPL: 1.4 G/DL
ALP SERPL-CCNC: 116 U/L (ref 39–117)
ALT SERPL W P-5'-P-CCNC: 36 U/L (ref 1–33)
ANION GAP SERPL CALCULATED.3IONS-SCNC: 12 MMOL/L (ref 5–15)
AST SERPL-CCNC: 23 U/L (ref 1–32)
BASOPHILS # BLD AUTO: 0.04 10*3/MM3 (ref 0–0.2)
BASOPHILS NFR BLD AUTO: 0.5 % (ref 0–1.5)
BILIRUB SERPL-MCNC: 0.3 MG/DL (ref 0–1.2)
BILIRUB UR QL STRIP: NEGATIVE
BUN SERPL-MCNC: 5.3 MG/DL (ref 6–20)
BUN/CREAT SERPL: 6.9 (ref 7–25)
CALCIUM SPEC-SCNC: 9.4 MG/DL (ref 8.6–10.5)
CHLORIDE SERPL-SCNC: 102 MMOL/L (ref 98–107)
CLARITY UR: CLEAR
CO2 SERPL-SCNC: 23 MMOL/L (ref 22–29)
COLOR UR: YELLOW
CREAT SERPL-MCNC: 0.77 MG/DL (ref 0.57–1)
D-LACTATE SERPL-SCNC: 0.8 MMOL/L (ref 0.5–2)
DEPRECATED RDW RBC AUTO: 39.3 FL (ref 37–54)
EGFRCR SERPLBLD CKD-EPI 2021: 97.7 ML/MIN/1.73
EOSINOPHIL # BLD AUTO: 0 10*3/MM3 (ref 0–0.4)
EOSINOPHIL NFR BLD AUTO: 0 % (ref 0.3–6.2)
ERYTHROCYTE [DISTWIDTH] IN BLOOD BY AUTOMATED COUNT: 13 % (ref 12.3–15.4)
GLOBULIN UR ELPH-MCNC: 3 GM/DL
GLUCOSE SERPL-MCNC: 112 MG/DL (ref 65–99)
GLUCOSE UR STRIP-MCNC: NEGATIVE MG/DL
HCT VFR BLD AUTO: 41.9 % (ref 34–46.6)
HGB BLD-MCNC: 14.2 G/DL (ref 12–15.9)
HGB UR QL STRIP.AUTO: NEGATIVE
IMM GRANULOCYTES # BLD AUTO: 0.1 10*3/MM3 (ref 0–0.05)
IMM GRANULOCYTES NFR BLD AUTO: 1.2 % (ref 0–0.5)
KETONES UR QL STRIP: NEGATIVE
LEUKOCYTE ESTERASE UR QL STRIP.AUTO: NEGATIVE
LIPASE SERPL-CCNC: 21 U/L (ref 13–60)
LYMPHOCYTES # BLD AUTO: 1.87 10*3/MM3 (ref 0.7–3.1)
LYMPHOCYTES NFR BLD AUTO: 21.7 % (ref 19.6–45.3)
MCH RBC QN AUTO: 28.1 PG (ref 26.6–33)
MCHC RBC AUTO-ENTMCNC: 33.9 G/DL (ref 31.5–35.7)
MCV RBC AUTO: 83 FL (ref 79–97)
MONOCYTES # BLD AUTO: 0.62 10*3/MM3 (ref 0.1–0.9)
MONOCYTES NFR BLD AUTO: 7.2 % (ref 5–12)
NEUTROPHILS NFR BLD AUTO: 5.97 10*3/MM3 (ref 1.7–7)
NEUTROPHILS NFR BLD AUTO: 69.4 % (ref 42.7–76)
NITRITE UR QL STRIP: NEGATIVE
NRBC BLD AUTO-RTO: 0 /100 WBC (ref 0–0.2)
PH UR STRIP.AUTO: 8.5 [PH] (ref 5–8)
PLATELET # BLD AUTO: 203 10*3/MM3 (ref 140–450)
PMV BLD AUTO: 9.7 FL (ref 6–12)
POTASSIUM SERPL-SCNC: 4.5 MMOL/L (ref 3.5–5.2)
PROT SERPL-MCNC: 7.2 G/DL (ref 6–8.5)
PROT UR QL STRIP: NEGATIVE
RBC # BLD AUTO: 5.05 10*6/MM3 (ref 3.77–5.28)
SODIUM SERPL-SCNC: 137 MMOL/L (ref 136–145)
SP GR UR STRIP: 1.01 (ref 1–1.03)
UROBILINOGEN UR QL STRIP: ABNORMAL
WBC NRBC COR # BLD AUTO: 8.6 10*3/MM3 (ref 3.4–10.8)

## 2025-06-23 PROCEDURE — 96361 HYDRATE IV INFUSION ADD-ON: CPT

## 2025-06-23 PROCEDURE — G0378 HOSPITAL OBSERVATION PER HR: HCPCS

## 2025-06-23 PROCEDURE — 96376 TX/PRO/DX INJ SAME DRUG ADON: CPT

## 2025-06-23 PROCEDURE — 74176 CT ABD & PELVIS W/O CONTRAST: CPT

## 2025-06-23 PROCEDURE — 63710000001 DIPHENHYDRAMINE PER 50 MG: Performed by: STUDENT IN AN ORGANIZED HEALTH CARE EDUCATION/TRAINING PROGRAM

## 2025-06-23 PROCEDURE — 25810000003 SODIUM CHLORIDE 0.9 % SOLUTION: Performed by: STUDENT IN AN ORGANIZED HEALTH CARE EDUCATION/TRAINING PROGRAM

## 2025-06-23 PROCEDURE — 96375 TX/PRO/DX INJ NEW DRUG ADDON: CPT

## 2025-06-23 PROCEDURE — 25010000002 MORPHINE PER 10 MG: Performed by: STUDENT IN AN ORGANIZED HEALTH CARE EDUCATION/TRAINING PROGRAM

## 2025-06-23 PROCEDURE — 25810000003 SODIUM CHLORIDE 0.9 % SOLUTION: Performed by: PHYSICIAN ASSISTANT

## 2025-06-23 PROCEDURE — 25010000002 ONDANSETRON PER 1 MG: Performed by: PHYSICIAN ASSISTANT

## 2025-06-23 PROCEDURE — 85025 COMPLETE CBC W/AUTO DIFF WBC: CPT | Performed by: PHYSICIAN ASSISTANT

## 2025-06-23 PROCEDURE — 83690 ASSAY OF LIPASE: CPT | Performed by: PHYSICIAN ASSISTANT

## 2025-06-23 PROCEDURE — 80053 COMPREHEN METABOLIC PANEL: CPT | Performed by: PHYSICIAN ASSISTANT

## 2025-06-23 PROCEDURE — 83605 ASSAY OF LACTIC ACID: CPT | Performed by: PHYSICIAN ASSISTANT

## 2025-06-23 PROCEDURE — 99285 EMERGENCY DEPT VISIT HI MDM: CPT

## 2025-06-23 PROCEDURE — 63710000001 ONDANSETRON ODT 4 MG TABLET DISPERSIBLE: Performed by: STUDENT IN AN ORGANIZED HEALTH CARE EDUCATION/TRAINING PROGRAM

## 2025-06-23 PROCEDURE — 99222 1ST HOSP IP/OBS MODERATE 55: CPT | Performed by: PHYSICIAN ASSISTANT

## 2025-06-23 PROCEDURE — 81003 URINALYSIS AUTO W/O SCOPE: CPT | Performed by: PHYSICIAN ASSISTANT

## 2025-06-23 PROCEDURE — 96374 THER/PROPH/DIAG INJ IV PUSH: CPT

## 2025-06-23 PROCEDURE — 25010000002 HYDROMORPHONE PER 4 MG: Performed by: EMERGENCY MEDICINE

## 2025-06-23 RX ORDER — ZOLPIDEM TARTRATE 5 MG/1
10 TABLET ORAL NIGHTLY
Status: DISCONTINUED | OUTPATIENT
Start: 2025-06-23 | End: 2025-06-25 | Stop reason: HOSPADM

## 2025-06-23 RX ORDER — ALPRAZOLAM 1 MG/1
2 TABLET ORAL AS NEEDED
Status: DISCONTINUED | OUTPATIENT
Start: 2025-06-23 | End: 2025-06-23 | Stop reason: SDUPTHER

## 2025-06-23 RX ORDER — HYDROCODONE BITARTRATE AND ACETAMINOPHEN 5; 325 MG/1; MG/1
1 TABLET ORAL EVERY 8 HOURS PRN
Status: DISCONTINUED | OUTPATIENT
Start: 2025-06-23 | End: 2025-06-23

## 2025-06-23 RX ORDER — DIPHENHYDRAMINE HCL 50 MG
50 CAPSULE ORAL NIGHTLY
Status: DISCONTINUED | OUTPATIENT
Start: 2025-06-23 | End: 2025-06-25 | Stop reason: HOSPADM

## 2025-06-23 RX ORDER — PROPRANOLOL HYDROCHLORIDE 10 MG/1
10 TABLET ORAL 2 TIMES DAILY
Status: DISCONTINUED | OUTPATIENT
Start: 2025-06-23 | End: 2025-06-25 | Stop reason: HOSPADM

## 2025-06-23 RX ORDER — PANTOPRAZOLE SODIUM 40 MG/1
40 TABLET, DELAYED RELEASE ORAL
Status: DISCONTINUED | OUTPATIENT
Start: 2025-06-24 | End: 2025-06-25 | Stop reason: HOSPADM

## 2025-06-23 RX ORDER — OXCARBAZEPINE 150 MG/1
150 TABLET, FILM COATED ORAL 2 TIMES DAILY
Status: DISCONTINUED | OUTPATIENT
Start: 2025-06-23 | End: 2025-06-23

## 2025-06-23 RX ORDER — SODIUM CHLORIDE 9 MG/ML
40 INJECTION, SOLUTION INTRAVENOUS AS NEEDED
Status: DISCONTINUED | OUTPATIENT
Start: 2025-06-23 | End: 2025-06-25 | Stop reason: HOSPADM

## 2025-06-23 RX ORDER — ACETAMINOPHEN 160 MG/5ML
650 SOLUTION ORAL EVERY 4 HOURS PRN
Status: DISCONTINUED | OUTPATIENT
Start: 2025-06-23 | End: 2025-06-25 | Stop reason: HOSPADM

## 2025-06-23 RX ORDER — SODIUM CHLORIDE 0.9 % (FLUSH) 0.9 %
10 SYRINGE (ML) INJECTION AS NEEDED
Status: DISCONTINUED | OUTPATIENT
Start: 2025-06-23 | End: 2025-06-25 | Stop reason: HOSPADM

## 2025-06-23 RX ORDER — ACETAMINOPHEN 325 MG/1
650 TABLET ORAL EVERY 4 HOURS PRN
Status: DISCONTINUED | OUTPATIENT
Start: 2025-06-23 | End: 2025-06-25 | Stop reason: HOSPADM

## 2025-06-23 RX ORDER — ESCITALOPRAM OXALATE 10 MG/1
20 TABLET ORAL DAILY
Status: DISCONTINUED | OUTPATIENT
Start: 2025-06-23 | End: 2025-06-25 | Stop reason: HOSPADM

## 2025-06-23 RX ORDER — HYDROMORPHONE HYDROCHLORIDE 1 MG/ML
0.5 INJECTION, SOLUTION INTRAMUSCULAR; INTRAVENOUS; SUBCUTANEOUS ONCE
Refills: 0 | Status: COMPLETED | OUTPATIENT
Start: 2025-06-23 | End: 2025-06-23

## 2025-06-23 RX ORDER — ONDANSETRON 4 MG/1
4 TABLET, ORALLY DISINTEGRATING ORAL EVERY 6 HOURS PRN
Status: DISCONTINUED | OUTPATIENT
Start: 2025-06-23 | End: 2025-06-25 | Stop reason: HOSPADM

## 2025-06-23 RX ORDER — SODIUM CHLORIDE 9 MG/ML
75 INJECTION, SOLUTION INTRAVENOUS CONTINUOUS
Status: ACTIVE | OUTPATIENT
Start: 2025-06-23 | End: 2025-06-24

## 2025-06-23 RX ORDER — HYDROCODONE BITARTRATE AND ACETAMINOPHEN 5; 325 MG/1; MG/1
1 TABLET ORAL EVERY 4 HOURS PRN
Refills: 0 | Status: DISCONTINUED | OUTPATIENT
Start: 2025-06-23 | End: 2025-06-25 | Stop reason: HOSPADM

## 2025-06-23 RX ORDER — DULOXETIN HYDROCHLORIDE 30 MG/1
30 CAPSULE, DELAYED RELEASE ORAL DAILY
COMMUNITY

## 2025-06-23 RX ORDER — PROPRANOLOL HYDROCHLORIDE 10 MG/1
10 TABLET ORAL 2 TIMES DAILY
COMMUNITY

## 2025-06-23 RX ORDER — FLUTICASONE PROPIONATE 50 MCG
2 SPRAY, SUSPENSION (ML) NASAL DAILY PRN
COMMUNITY

## 2025-06-23 RX ORDER — VANCOMYCIN HYDROCHLORIDE 125 MG/1
125 CAPSULE ORAL 4 TIMES DAILY
Status: DISCONTINUED | OUTPATIENT
Start: 2025-06-23 | End: 2025-06-25 | Stop reason: HOSPADM

## 2025-06-23 RX ORDER — OXCARBAZEPINE 150 MG/1
300 TABLET, FILM COATED ORAL NIGHTLY
Status: DISCONTINUED | OUTPATIENT
Start: 2025-06-24 | End: 2025-06-24

## 2025-06-23 RX ORDER — ACETAMINOPHEN 650 MG/1
650 SUPPOSITORY RECTAL EVERY 4 HOURS PRN
Status: DISCONTINUED | OUTPATIENT
Start: 2025-06-23 | End: 2025-06-25 | Stop reason: HOSPADM

## 2025-06-23 RX ORDER — MORPHINE SULFATE 2 MG/ML
2 INJECTION, SOLUTION INTRAMUSCULAR; INTRAVENOUS EVERY 4 HOURS PRN
Status: DISCONTINUED | OUTPATIENT
Start: 2025-06-23 | End: 2025-06-24

## 2025-06-23 RX ORDER — FLUTICASONE PROPIONATE 50 MCG
2 SPRAY, SUSPENSION (ML) NASAL DAILY PRN
Status: DISCONTINUED | OUTPATIENT
Start: 2025-06-23 | End: 2025-06-25 | Stop reason: HOSPADM

## 2025-06-23 RX ORDER — ONDANSETRON 2 MG/ML
4 INJECTION INTRAMUSCULAR; INTRAVENOUS ONCE
Status: COMPLETED | OUTPATIENT
Start: 2025-06-23 | End: 2025-06-23

## 2025-06-23 RX ORDER — DULOXETIN HYDROCHLORIDE 30 MG/1
30 CAPSULE, DELAYED RELEASE ORAL DAILY
Status: DISCONTINUED | OUTPATIENT
Start: 2025-06-23 | End: 2025-06-25 | Stop reason: HOSPADM

## 2025-06-23 RX ORDER — SIMETHICONE 80 MG
80 TABLET,CHEWABLE ORAL 4 TIMES DAILY PRN
Status: DISCONTINUED | OUTPATIENT
Start: 2025-06-23 | End: 2025-06-25 | Stop reason: HOSPADM

## 2025-06-23 RX ORDER — NITROGLYCERIN 0.4 MG/1
0.4 TABLET SUBLINGUAL
Status: DISCONTINUED | OUTPATIENT
Start: 2025-06-23 | End: 2025-06-25 | Stop reason: HOSPADM

## 2025-06-23 RX ORDER — DICYCLOMINE HCL 20 MG
20 TABLET ORAL 4 TIMES DAILY
Status: DISCONTINUED | OUTPATIENT
Start: 2025-06-23 | End: 2025-06-25 | Stop reason: HOSPADM

## 2025-06-23 RX ORDER — LORAZEPAM 0.5 MG/1
0.5 TABLET ORAL EVERY 8 HOURS PRN
Status: DISCONTINUED | OUTPATIENT
Start: 2025-06-23 | End: 2025-06-25 | Stop reason: HOSPADM

## 2025-06-23 RX ORDER — SODIUM CHLORIDE 0.9 % (FLUSH) 0.9 %
10 SYRINGE (ML) INJECTION EVERY 12 HOURS SCHEDULED
Status: DISCONTINUED | OUTPATIENT
Start: 2025-06-23 | End: 2025-06-25 | Stop reason: HOSPADM

## 2025-06-23 RX ORDER — SPIRONOLACTONE 25 MG/1
100 TABLET ORAL DAILY
Status: DISCONTINUED | OUTPATIENT
Start: 2025-06-23 | End: 2025-06-25 | Stop reason: HOSPADM

## 2025-06-23 RX ADMIN — SODIUM CHLORIDE 1000 ML: 9 INJECTION, SOLUTION INTRAVENOUS at 08:45

## 2025-06-23 RX ADMIN — DICYCLOMINE HYDROCHLORIDE 20 MG: 20 TABLET ORAL at 13:37

## 2025-06-23 RX ADMIN — HYDROCODONE BITARTRATE AND ACETAMINOPHEN 1 TABLET: 5; 325 TABLET ORAL at 13:26

## 2025-06-23 RX ADMIN — MORPHINE SULFATE 2 MG: 2 INJECTION, SOLUTION INTRAMUSCULAR; INTRAVENOUS at 20:45

## 2025-06-23 RX ADMIN — HYDROMORPHONE HYDROCHLORIDE 0.5 MG: 1 INJECTION, SOLUTION INTRAMUSCULAR; INTRAVENOUS; SUBCUTANEOUS at 08:46

## 2025-06-23 RX ADMIN — Medication 10 ML: at 14:14

## 2025-06-23 RX ADMIN — HYDROMORPHONE HYDROCHLORIDE 0.5 MG: 1 INJECTION, SOLUTION INTRAMUSCULAR; INTRAVENOUS; SUBCUTANEOUS at 09:57

## 2025-06-23 RX ADMIN — ONDANSETRON 4 MG: 2 INJECTION INTRAMUSCULAR; INTRAVENOUS at 08:46

## 2025-06-23 RX ADMIN — VANCOMYCIN HYDROCHLORIDE 125 MG: 125 CAPSULE ORAL at 13:37

## 2025-06-23 RX ADMIN — Medication 10 ML: at 13:27

## 2025-06-23 RX ADMIN — SODIUM CHLORIDE 75 ML/HR: 9 INJECTION, SOLUTION INTRAVENOUS at 13:27

## 2025-06-23 RX ADMIN — ONDANSETRON 4 MG: 4 TABLET, ORALLY DISINTEGRATING ORAL at 13:59

## 2025-06-23 RX ADMIN — DICYCLOMINE HYDROCHLORIDE 20 MG: 20 TABLET ORAL at 20:45

## 2025-06-23 RX ADMIN — Medication 10 ML: at 20:44

## 2025-06-23 RX ADMIN — HYDROCODONE BITARTRATE AND ACETAMINOPHEN 1 TABLET: 5; 325 TABLET ORAL at 17:42

## 2025-06-23 RX ADMIN — PROPRANOLOL HYDROCHLORIDE 10 MG: 10 TABLET ORAL at 20:45

## 2025-06-23 RX ADMIN — DIPHENHYDRAMINE HYDROCHLORIDE 50 MG: 50 CAPSULE ORAL at 20:45

## 2025-06-23 RX ADMIN — DICYCLOMINE HYDROCHLORIDE 20 MG: 20 TABLET ORAL at 17:42

## 2025-06-23 RX ADMIN — VANCOMYCIN HYDROCHLORIDE 125 MG: 125 CAPSULE ORAL at 17:42

## 2025-06-23 RX ADMIN — ZOLPIDEM TARTRATE 10 MG: 5 TABLET ORAL at 20:45

## 2025-06-23 RX ADMIN — OLANZAPINE 7.5 MG: 2.5 TABLET, FILM COATED ORAL at 20:44

## 2025-06-23 RX ADMIN — VANCOMYCIN HYDROCHLORIDE 125 MG: 125 CAPSULE ORAL at 20:45

## 2025-06-23 RX ADMIN — MORPHINE SULFATE 2 MG: 2 INJECTION, SOLUTION INTRAMUSCULAR; INTRAVENOUS at 14:14

## 2025-06-23 RX ADMIN — SIMETHICONE 80 MG: 80 TABLET, CHEWABLE ORAL at 17:42

## 2025-06-23 NOTE — H&P
[facility] Medicine Services  HISTORY AND PHYSICAL    Patient Name: Hao Platt  : 1980  MRN: [id number]  Primary Care Physician: Duy Baltazar MD  Date of admission: 2025      Subjective   Subjective     Chief Complaint:  Lower abdominal pain, N/V/D    HPI:  Hao Platt is a 44 y.o. female with PMHx GERD, obesity s/p Lap sleeve gastrectomy (Dr. Barnhart, 2017) , anxiety/depression, bipolar disorder, sinusitis/seasonal allergies, former tobacco abuse, recent C.difficile colitis/norovirus diagnosis here at [facility] 3 days ago.  She was treated with Augmentin as outpatient for a sinus infection 2 weeks ago, likely precipitating her Cdiff colitis.  She states the loose bowel movements started ~ 5 days after starting Augmentin.  She was given Rx 2025 for oral vancomycin.  She presents back to [facility] ED today with worsening lower abdominal pain with persistent nausea and diarrhea. CT A/P shows resolved findings of acute sigmoid colitis, no evidence of bowel obstruction. Hospital medicine was contacted for admission of management of symptoms. WBC is normal at 8.6 (down from 14.44 3 days ago). She is afebrile, normotensive, normal rate.  Maintaining 96% O2 on RA.  No one else in family has been ill.  No recent travel.  Patient and family report good hygiene, good water care/source at home.      Personal History     Past Medical History:   Diagnosis Date    Anxiety     Asthma     as a child    Back pain     Bipolar disorder     Depression     Fatigue     GERD (gastroesophageal reflux disease)     uncontrolled w/ BID PPI    Head injury     Insomnia     Iron deficiency     prn IV iron, last infusion 2024     (normal spontaneous vaginal delivery)     x 3, no issues    Peripheral edema     Rectal bleeding     had C-scope.  from hemorrhoids    Sleep apnea     suspected, never tested           Past Surgical History:   Procedure Laterality Date    BLADDER  SUSPENSION  2022    w/ repair of rectal/uterine prolapse, no mesh    COLONOSCOPY  2015    ENDOSCOPY N/A 12/12/2017    Procedure: ESOPHAGOGASTRODUODENOSCOPY;  Surgeon: Rita Barnhart MD;  Location:  [facility] OR;  Service:     GASTRIC SLEEVE LAPAROSCOPIC N/A 12/12/2017    Procedure: GASTRIC SLEEVE LAPAROSCOPIC;  Surgeon: Rita Barnhart MD;  Location:  [facility] OR;  Service:     LAPAROSCOPIC APPENDECTOMY  2013    LAPAROSCOPIC CHOLECYSTECTOMY  2018    no stones, suspected dysfunction    LAPAROSCOPIC HYSTERECTOMY  2023    benign dz - ovaries intact    SINUS SURGERY  2012    X 2    TUBAL ABDOMINAL LIGATION  2017       Family History: family history includes Cancer in her father; Heart disease in her paternal grandmother; Hypertension in her maternal grandfather, maternal grandmother, mother, paternal grandmother, and sister.     Social History:  reports that she quit smoking about 9 years ago. Her smoking use included cigarettes. She started smoking about 34 years ago. She has never used smokeless tobacco. She reports that she does not drink alcohol and does not use drugs.  Social History     Social History Narrative    Lives in [place], [place].   with three children.  Disabled since 2014 d/t severe anxiety.        Medications:  Available home medication information reviewed.  ALPRAZolam, DULoxetine, OLANZapine, OXcarbazepine, acyclovir, diphenhydrAMINE HCl, escitalopram, fluticasone, omeprazole, ondansetron, propranolol, spironolactone, vancomycin, and zolpidem    Allergies   Allergen Reactions    Lamotrigine Rash       Objective   Objective     Vital Signs:   Temp:  [97.7 °F (36.5 °C)] 97.7 °F (36.5 °C)  Heart Rate:  [59-79] 62  Resp:  [20] 20  BP: (113-135)/(75-97) 113/97     PHYSICAL EXAM:   Constitutional: Awake, alert. Mild acute distress with pain.  Eyes: PERRLA, sclerae anicteric, no conjunctival injection  HENT: NCAT, mucous membranes moist  Neck: Supple, no thyromegaly, no lymphadenopathy, trachea  midline  Respiratory: Clear to auscultation bilaterally, nonlabored respirations   Cardiovascular: RRR, no murmurs, rubs, or gallops, palpable pedal pulses bilaterally  Gastrointestinal: Positive bowel sounds, soft, nontender, nondistended. Obese.  Musculoskeletal: No bilateral ankle edema, no clubbing or cyanosis to extremities  Psychiatric: Appropriate affect, cooperative  Neurologic: Oriented x 3, strength symmetric in all extremities, Cranial Nerves grossly intact to confrontation, speech clear  Skin: No rashes    Result Review:  I have personally reviewed the results from the time of this admission to 6/23/2025 12:35 EDT and agree with these findings:  [x]  Laboratory list / accordion  [x]  Microbiology  [x]  Radiology  [x]  EKG/Telemetry   [x]  Cardiology/Vascular   []  Pathology  [x]  Old records  []  Other:  Most notable findings include: Positive C.diff by PCR, Ag and + Norovirus from 6/20/2025.      LAB RESULTS:      Lab 06/23/25  0840 06/20/25  1038   WBC 8.60 14.44*   HEMOGLOBIN 14.2 13.9   HEMATOCRIT 41.9 41.4   PLATELETS 203 176   NEUTROS ABS 5.97 10.98*   IMMATURE GRANS (ABS) 0.10* 0.05   LYMPHS ABS 1.87 2.01   MONOS ABS 0.62 1.35*   EOS ABS 0.00 0.01   MCV 83.0 83.1   CRP  --  4.32*   PROCALCITONIN  --  0.06   LACTATE 0.8 1.4         Lab 06/23/25  0840 06/20/25  1038   SODIUM 137 137   POTASSIUM 4.5 4.4   CHLORIDE 102 103   CO2 23.0 23.0   ANION GAP 12.0 11.0   BUN 5.3* 8.2   CREATININE 0.77 0.90   EGFR 97.7 81.0   GLUCOSE 112* 101*   CALCIUM 9.4 9.3         Lab 06/23/25  0840 06/20/25  1038   TOTAL PROTEIN 7.2 7.0   ALBUMIN 4.2 4.1   GLOBULIN 3.0 2.9   ALT (SGPT) 36* 25   AST (SGOT) 23 17   BILIRUBIN 0.3 0.5   ALK PHOS 116 111   LIPASE 21 20                     UA          6/23/2025    08:40   Urinalysis   Specific Gravity, UA 1.007    Ketones, UA Negative    Blood, UA Negative    Leukocytes, UA Negative    Nitrite, UA Negative        Microbiology Results (last 10 days)       Procedure Component  Value - Date/Time    Gastrointestinal Panel, PCR - Stool, Per Rectum [[id number]]  (Abnormal) Collected: 06/20/25 1039    Lab Status: Final result Specimen: Stool from Per Rectum Updated: 06/20/25 1337     Campylobacter Not Detected     Plesiomonas shigelloides Not Detected     Salmonella Not Detected     Vibrio Not Detected     Vibrio cholerae Not Detected     Yersinia enterocolitica Not Detected     Enteroaggregative E. coli (EAEC) Not Detected     Enteropathogenic E. coli (EPEC) Not Detected     Enterotoxigenic E. coli (ETEC) lt/st Not Detected     Shiga-like toxin-producing E. coli (STEC) stx1/stx2 Not Detected     Shigella/Enteroinvasive E. coli (EIEC) Not Detected     Cryptosporidium Not Detected     Cyclospora cayetanensis Not Detected     Entamoeba histolytica Not Detected     Giardia lamblia Not Detected     Adenovirus F40/41 Not Detected     Astrovirus Not Detected     Norovirus GI/GII Detected     Comment: If a positive Norovirus result is inconsistent with clinical presentation, the positive Norovirus result should be confirmed using another method.        Rotavirus A Not Detected     Sapovirus (I, II, IV or V) Not Detected    Clostridioides difficile Toxin - Stool, Per Rectum [[id number]]  (Abnormal) Collected: 06/20/25 1039    Lab Status: Final result Specimen: Stool from Per Rectum Updated: 06/20/25 1237    Narrative:      The following orders were created for panel order Clostridioides difficile Toxin - Stool, Per Rectum.  Procedure                               Abnormality         Status                     ---------                               -----------         ------                     Clostridioides difficile...[[id number]]  Abnormal            Final result                 Please view results for these tests on the individual orders.    Clostridioides difficile Toxin, PCR - Stool, Per Rectum [[id number]]  (Abnormal) Collected: 06/20/25 1039    Lab Status: Final result Specimen: Stool from Per  Rectum Updated: 06/20/25 1237     Toxigenic C. difficile by PCR Detected    Narrative:      DNA from a toxigenic strain of C.difficile has been detected.    Clostridioides difficile toxin Ag, Reflex - Stool, Per Rectum [[id number]]  (Abnormal) Collected: 06/20/25 1039    Lab Status: Final result Specimen: Stool from Per Rectum Updated: 06/20/25 1409     C.diff Toxin Ag Positive    Narrative:      DNA from a toxigenic strain of C.difficile was detected, along with the presence of free toxin. These results are suggestive of C.difficile infection.            CT Abdomen Pelvis Without Contrast  Result Date: 6/23/2025  CT ABDOMEN PELVIS WO CONTRAST Date of Exam: 6/23/2025 9:00 AM EDT Indication: worsening diffuse lower abdominal pain, nausea, diarrhea, recent CT showing colitis. Comparison: CT abdomen pelvis 6/20/2025. Technique: Axial CT images were obtained of the abdomen and pelvis without the administration of contrast. Reconstructed coronal and sagittal images were also obtained. Automated exposure control and iterative construction methods were used. Findings: Visualized lung bases appear clear without focal airspace consolidation. No pleural or pericardial effusion. No morphologic changes of chronic liver disease. Cyst in the lateral left hepatic lobe. Cholecystectomy. No biliary ductal dilatation. No findings of acute pancreatitis. Spleen at upper limits of normal size, unchanged. No distinct adrenal nodule. Kidneys are symmetric in size without hydronephrosis. Urinary bladder is unremarkable. Hysterectomy. Mobile cecum positioned in the anterior mid abdomen. Appendectomy. Gastric sleeve. Small hiatal hernia. No bowel obstruction. No free air. No free fluid or organized fluid collection. Couple mildly prominent lymph nodes within the mesentery and also adjacent to the cecum, unchanged and nonspecific. No new or progressive lymphadenopathy. No abdominal aortic aneurysm. No body wall abnormality. No acute or  suspicious osseous lesion.     Impression: Impression: Resolved findings of acute sigmoid colitis seen on recent exam. Mildly mobile cecum positioned in the anterior mid abdomen. No evidence of bowel obstruction. Couple mildly prominent lymph nodes within the mesentery and also adjacent to the cecum, unchanged and nonspecific. No new or progressive lymphadenopathy. Electronically Signed: Brian Plaza MD  6/23/2025 9:31 AM EDT  Workstation ID: [id number]          Assessment & Plan   Assessment & Plan     Hao Platt is a 45 yo CF diagnosed with C.difficile colitis/norovius here at [facility] 6/20/2025 and sent home with outpatient Rx oral vancomycin 125mg Q6 x 10 days.  She had recently taken Augmentin for sinus infection, likely precipitating Cdiff colitis.  She presents back to [facility] ED with worsening abdominal pain, persistent nausea and diarrhea.  Admitted for management of GI symptoms.    Lower Abdominal Pain  Nausea  Persistent Diarrhea  C.difficile colitis/Norovirus, diagnosed 6/20/2025  - Normal Electrolytes.  Normal LFTs.  WBC normal/down from 3 days ago  - CT Abd/Pelvis shows resolved findings of acute sigmoid colitis  - Admit for pain control, fluids/hydration. Trial Bentyl for spasms  - Continue oral Vancomycin 125mg Q6 hours x10 days (first dose 6/20/2025).  - Allow for diet as tolerated  - Isolation for CDiff.    GERD  -Continue PPI    Obesity  -S/P laparoscopic sleeve gastrectomy (Dr. Barnhart, 12-)   -Body mass index is 43.27 kg/m².  - Flex Sig 2/5/2025: no post op strictures, mod GERD, mild esophageal dysmotility, small sliding-type hiatal hernia    Anxiety/Depression  -continue home medications.        VTE Prophylaxis: Patient is ambulatory            CODE STATUS:    Code Status and Medical Interventions: CPR (Attempt to Resuscitate); Full Support   Ordered at: 06/23/25 1204     Code Status (Patient has no pulse and is not breathing):    CPR (Attempt to Resuscitate)     Medical Interventions  (Patient has pulse or is breathing):    Full Support     Level Of Support Discussed With:    Patient       Expected Discharge 6/25/2025  Expected discharge date/ time has not been documented.     Ira CHURCH Case, LUANA  06/23/25

## 2025-06-23 NOTE — ED PROVIDER NOTES
Subjective   History of Present Illness  Ms. Platt is a 44-year-old female with history of laparoscopic sleeve gastrectomy (Dr. Barnhart, 2017), prior hysterectomy, appendectomy, cholecystectomy, who returns to the emergency department with complaints of worsening diffuse lower abdominal pain, nausea without vomiting and some diarrhea.  The patient notes that she was seen here 3 days ago with the same complaints and was diagnosed with C. difficile colitis and norovirus.  She was discharged home on vancomycin.  She states that her abdominal pain has only worsened.  She denies fever or chills.  No bloody stools.  No urinary symptoms.  She notes that prior to developing her symptoms, she had been on Augmentin for a sinus infection a couple of weeks ago, likely resulting in her C. difficile colitis.      Review of Systems   Constitutional:  Positive for appetite change. Negative for chills and fever.   HENT:  Negative for sore throat.    Respiratory:  Negative for cough and shortness of breath.    Cardiovascular:  Negative for chest pain.   Gastrointestinal:  Positive for abdominal pain (Diffuse lower), diarrhea and nausea. Negative for blood in stool and vomiting.   Genitourinary:  Negative for dysuria.   Musculoskeletal:  Negative for back pain.   Skin:  Negative for color change.   Neurological:  Positive for weakness (Generalized). Negative for headaches.   Psychiatric/Behavioral:  Negative for confusion.        Past Medical History:   Diagnosis Date    Anxiety     Asthma     as a child    Back pain     Bipolar disorder     Depression     Fatigue     GERD (gastroesophageal reflux disease)     uncontrolled w/ BID PPI    Head injury     Insomnia     Iron deficiency     prn IV iron, last infusion 2024     (normal spontaneous vaginal delivery)     x 3, no issues    Peripheral edema     Rectal bleeding     had C-scope.  from hemorrhoids    Sleep apnea     suspected, never tested       Allergies    Allergen Reactions    Lamotrigine Rash       Past Surgical History:   Procedure Laterality Date    BLADDER SUSPENSION      w/ repair of rectal/uterine prolapse, no mesh    COLONOSCOPY      ENDOSCOPY N/A 2017    Procedure: ESOPHAGOGASTRODUODENOSCOPY;  Surgeon: Rita Barnhart MD;  Location:  [facility] OR;  Service:     GASTRIC SLEEVE LAPAROSCOPIC N/A 2017    Procedure: GASTRIC SLEEVE LAPAROSCOPIC;  Surgeon: Rita Barnhart MD;  Location:  [facility] OR;  Service:     LAPAROSCOPIC APPENDECTOMY      LAPAROSCOPIC CHOLECYSTECTOMY      no stones, suspected dysfunction    LAPAROSCOPIC HYSTERECTOMY      benign dz - ovaries intact    SINUS SURGERY  2012    X 2    TUBAL ABDOMINAL LIGATION  2017       Family History   Problem Relation Age of Onset    Hypertension Mother     Cancer Father     Hypertension Sister     Hypertension Maternal Grandmother     Hypertension Maternal Grandfather     Hypertension Paternal Grandmother     Heart disease Paternal Grandmother        Social History     Socioeconomic History    Marital status:      Spouse name: Hector Platt    Number of children: 3    Years of education: College   Tobacco Use    Smoking status: Former     Current packs/day: 0.00     Types: Cigarettes     Start date:      Quit date:      Years since quittin.4    Smokeless tobacco: Never   Vaping Use    Vaping status: Never Used   Substance and Sexual Activity    Alcohol use: No    Drug use: No    Sexual activity: Defer     Birth control/protection: Surgical           Objective   Physical Exam  Constitutional:       General: She is in acute distress (appears in pain, rocking and grimacing).      Appearance: She is well-developed.   HENT:      Mouth/Throat:      Mouth: Mucous membranes are moist.   Cardiovascular:      Rate and Rhythm: Normal rate and regular rhythm.   Pulmonary:      Effort: Pulmonary effort is normal.      Breath sounds: Normal breath sounds.    Abdominal:      General: Bowel sounds are decreased.      Palpations: Abdomen is soft.      Tenderness: There is generalized abdominal tenderness. There is no guarding or rebound.   Skin:     General: Skin is warm and dry.   Neurological:      Mental Status: She is alert and oriented to person, place, and time.   Psychiatric:         Mood and Affect: Mood normal.         Procedures           ED Course      In the summary, 44-year-old female with history of LSG (2017), hysterectomy, appendectomy, cholecystectomy, presents with worsening diffuse lower abdominal pain, nausea and diarrhea.  Patient was seen here 3 days ago with the same symptoms and diagnosed with C. difficile colitis and norovirus.  She was discharged on vancomycin.  She states that her symptoms are worse now than they were before.  No fever or chills.  Patient notes that she had been on Augmentin 2 weeks ago for a sinus infection, likely precipitating the C. difficile colitis.    MDM: Differential includes worsening colitis, diverticulitis, UTI, continued norovirus, etc.    Basic labs repeated.  UA checked.  Patient sent for repeat CT abdomen/pelvis without contrast for comparison to previous CT.  Patient given IV fluids, Dilaudid and Zofran.  Prior records reviewed.    White count has normalized and is now 8.6, down from 14K 3 days ago.  No anemia.    No concerning chemistries.  Normal LFTs.  Normal creatinine at 0.77.  Normal lipase at 21.  Lactic acid is normal at 0.8.  Urinalysis shows no evidence of infection.    CT abdomen/pelvis without contrast:  Visualized lung bases appear clear without focal airspace consolidation. No pleural or pericardial effusion.     No morphologic changes of chronic liver disease. Cyst in the lateral left hepatic lobe. Cholecystectomy. No biliary ductal dilatation. No findings of acute pancreatitis. Spleen at upper limits of normal size, unchanged.     No distinct adrenal nodule. Kidneys are symmetric in size without  hydronephrosis. Urinary bladder is unremarkable. Hysterectomy.     Mobile cecum positioned in the anterior mid abdomen. Appendectomy. Gastric sleeve. Small hiatal hernia. No bowel obstruction. No free air. No free fluid or organized fluid collection.     Couple mildly prominent lymph nodes within the mesentery and also adjacent to the cecum, unchanged and nonspecific. No new or progressive lymphadenopathy. No abdominal aortic aneurysm. No body wall abnormality. No acute or suspicious osseous lesion.     IMPRESSION:  Impression:  Resolved findings of acute sigmoid colitis seen on recent exam.      Mildly mobile cecum positioned in the anterior mid abdomen. No evidence of bowel obstruction.     Couple mildly prominent lymph nodes within the mesentery and also adjacent to the cecum, unchanged and nonspecific. No new or progressive lymphadenopathy.    10:15 EDT  I spoke with the patient about her workup.  I discussed the possibility of discharge home to continue symptomatic treatment and her vancomycin.  She states that her pain is much worse than before and feels that she needs to come in for symptom control until things improve a bit.  She does look to be in quite a bit of pain.  With this being her second ED visit in 3 days, I think it is reasonable to bring her in for fluids and symptom control.  Will discuss with the hospitalist for admission.                                                           Medical Decision Making  Amount and/or Complexity of Data Reviewed  Labs: ordered.  Radiology: ordered.    Risk  Prescription drug management.        Final diagnoses:   Intractable abdominal pain   C. difficile colitis   Norovirus       ED Disposition  ED Disposition       ED Disposition   Decision to Admit    Condition   --    Comment   --               No follow-up provider specified.       Medication List        ASK your doctor about these medications      HYDROcodone-acetaminophen 5-325 MG per tablet  Commonly  known as: NORCO  Take 1 tablet by mouth Every 8 (Eight) Hours As Needed for Severe Pain for up to 2 days.  Ask about: Should I take this medication?                 Neeraj Spain, PA  06/23/25 2229

## 2025-06-23 NOTE — PLAN OF CARE
Goal Outcome Evaluation:      Admitted to  around 1200 today. Pain has been moderate - severe - controlled with PRN morphine and norco. C. Diff positive. Has had many Bms today. Resting in bed currently with no complaints at this time. Will continue plan of care.

## 2025-06-23 NOTE — ED NOTES
Hao Platt    Nursing Report ED to Floor:  Mental status: A&Ox4  Ambulatory status: independent  Oxygen Therapy:  RA/ 2L NC after pain med administration  Cardiac Rhythm: NS  Admitted from: Home  Safety Concerns:  none  Precautions: contact  Social Issues: none  ED Room #:  12    ED Nurse Phone Extension - [phone] or may call [phone].      HPI:   Chief Complaint   Patient presents with    Abdominal Pain       Past Medical History:  Past Medical History:   Diagnosis Date    Anxiety     Asthma     as a child    Back pain     Bipolar disorder     Depression     Fatigue     GERD (gastroesophageal reflux disease)     uncontrolled w/ BID PPI    Head injury     Insomnia     Iron deficiency     prn IV iron, last infusion 2024     (normal spontaneous vaginal delivery)     x 3, no issues    Peripheral edema     Rectal bleeding     had C-scope.  from hemorrhoids    Sleep apnea     suspected, never tested        Past Surgical History:  Past Surgical History:   Procedure Laterality Date    BLADDER SUSPENSION      w/ repair of rectal/uterine prolapse, no mesh    COLONOSCOPY      ENDOSCOPY N/A 2017    Procedure: ESOPHAGOGASTRODUODENOSCOPY;  Surgeon: Rita Barnhart MD;  Location:  [facility];  Service:     GASTRIC SLEEVE LAPAROSCOPIC N/A 2017    Procedure: GASTRIC SLEEVE LAPAROSCOPIC;  Surgeon: Rita Barnhart MD;  Location:  [facility];  Service:     LAPAROSCOPIC APPENDECTOMY  2013    LAPAROSCOPIC CHOLECYSTECTOMY  2018    no stones, suspected dysfunction    LAPAROSCOPIC HYSTERECTOMY      benign dz - ovaries intact    SINUS SURGERY  2012    X 2    TUBAL ABDOMINAL LIGATION  2017        Admitting Doctor:   Deven Corrales DO    Consulting Provider(s):  Consults       No orders found from 2025 to 2025.             Admitting Diagnosis:   The primary encounter diagnosis was Intractable abdominal pain. Diagnoses of C. difficile colitis and Norovirus were also pertinent to this  visit.    Most Recent Vitals:   Vitals:    06/23/25 1033 06/23/25 1038 06/23/25 1058 06/23/25 1100   BP: 113/75  119/79 119/79   BP Location:       Patient Position:       Pulse:  59 62 64   Resp:       Temp:       TempSrc:       SpO2:  98% 97% 97%   Weight:       Height:           Active LDAs/IV Access:   Lines, Drains & Airways       Active LDAs       Name Placement date Placement time Site Days    Peripheral IV 06/23/25 0840 18 G Right Antecubital 06/23/25  0840  Antecubital  less than 1                    Labs (abnormal labs have a star):   Labs Reviewed   COMPREHENSIVE METABOLIC PANEL - Abnormal; Notable for the following components:       Result Value    Glucose 112 (*)     BUN 5.3 (*)     ALT (SGPT) 36 (*)     BUN/Creatinine Ratio 6.9 (*)     All other components within normal limits    Narrative:     GFR Categories in Chronic Kidney Disease (CKD)              GFR Category          GFR (mL/min/1.73)    Interpretation  G1                    90 or greater        Normal or high (1)  G2                    60-89                Mild decrease (1)  G3a                   45-59                Mild to moderate decrease  G3b                   30-44                Moderate to severe decrease  G4                    15-29                Severe decrease  G5                    14 or less           Kidney failure    (1)In the absence of evidence of kidney disease, neither GFR category G1 or G2 fulfill the criteria for CKD.    eGFR calculation 2021 CKD-EPI creatinine equation, which does not include race as a factor   URINALYSIS W/ MICROSCOPIC IF INDICATED (NO CULTURE) - Abnormal; Notable for the following components:    pH, UA 8.5 (*)     All other components within normal limits    Narrative:     Urine microscopic not indicated.   CBC WITH AUTO DIFFERENTIAL - Abnormal; Notable for the following components:    Eosinophil % 0.0 (*)     Immature Grans % 1.2 (*)     Immature Grans, Absolute 0.10 (*)     All other components  within normal limits   LIPASE - Normal   LACTIC ACID, PLASMA - Normal   CBC AND DIFFERENTIAL    Narrative:     The following orders were created for panel order CBC & Differential.  Procedure                               Abnormality         Status                     ---------                               -----------         ------                     CBC Auto Differential[[id number]]        Abnormal            Final result                 Please view results for these tests on the individual orders.       Meds Given in ED:   Medications   sodium chloride 0.9 % flush 10 mL (has no administration in time range)   sodium chloride 0.9 % bolus 1,000 mL (0 mL Intravenous Stopped 6/23/25 0943)   ondansetron (ZOFRAN) injection 4 mg (4 mg Intravenous Given 6/23/25 0846)   HYDROmorphone (DILAUDID) injection 0.5 mg (0.5 mg Intravenous Given 6/23/25 0846)   HYDROmorphone (DILAUDID) injection 0.5 mg (0.5 mg Intravenous Given 6/23/25 0957)           Last NIH score:                                                          Dysphagia screening results:  Patient Factors Component (Dysphagia:Stroke or Rule-out)  Best Eye Response: 4-->(E4) spontaneous (06/23/25 0842)  Best Motor Response: 6-->(M6) obeys commands (06/23/25 0842)  Best Verbal Response: 5-->(V5) oriented (06/23/25 0842)  [place] Coma Scale Score: 15 (06/23/25 0842)     [place] Coma Scale:  No data recorded     CIWA:        Restraint Type:            Isolation Status:  Contact Spore

## 2025-06-24 LAB
ALBUMIN SERPL-MCNC: 3.4 G/DL (ref 3.5–5.2)
ALBUMIN/GLOB SERPL: 1.5 G/DL
ALP SERPL-CCNC: 90 U/L (ref 39–117)
ALT SERPL W P-5'-P-CCNC: 31 U/L (ref 1–33)
ANION GAP SERPL CALCULATED.3IONS-SCNC: 8.8 MMOL/L (ref 5–15)
AST SERPL-CCNC: 22 U/L (ref 1–32)
BILIRUB SERPL-MCNC: 0.2 MG/DL (ref 0–1.2)
BUN SERPL-MCNC: 8 MG/DL (ref 6–20)
BUN/CREAT SERPL: 9.1 (ref 7–25)
CALCIUM SPEC-SCNC: 8.6 MG/DL (ref 8.6–10.5)
CHLORIDE SERPL-SCNC: 104 MMOL/L (ref 98–107)
CO2 SERPL-SCNC: 24.2 MMOL/L (ref 22–29)
CREAT SERPL-MCNC: 0.88 MG/DL (ref 0.57–1)
EGFRCR SERPLBLD CKD-EPI 2021: 83.2 ML/MIN/1.73
GLOBULIN UR ELPH-MCNC: 2.3 GM/DL
GLUCOSE SERPL-MCNC: 97 MG/DL (ref 65–99)
POTASSIUM SERPL-SCNC: 4.3 MMOL/L (ref 3.5–5.2)
PROT SERPL-MCNC: 5.7 G/DL (ref 6–8.5)
QT INTERVAL: 436 MS
QTC INTERVAL: 436 MS
SODIUM SERPL-SCNC: 137 MMOL/L (ref 136–145)

## 2025-06-24 PROCEDURE — 63710000001 DIPHENHYDRAMINE PER 50 MG: Performed by: STUDENT IN AN ORGANIZED HEALTH CARE EDUCATION/TRAINING PROGRAM

## 2025-06-24 PROCEDURE — 96361 HYDRATE IV INFUSION ADD-ON: CPT

## 2025-06-24 PROCEDURE — 99233 SBSQ HOSP IP/OBS HIGH 50: CPT | Performed by: PHYSICIAN ASSISTANT

## 2025-06-24 PROCEDURE — 80053 COMPREHEN METABOLIC PANEL: CPT | Performed by: STUDENT IN AN ORGANIZED HEALTH CARE EDUCATION/TRAINING PROGRAM

## 2025-06-24 PROCEDURE — 25810000003 SODIUM CHLORIDE 0.9 % SOLUTION: Performed by: PHYSICIAN ASSISTANT

## 2025-06-24 PROCEDURE — 96376 TX/PRO/DX INJ SAME DRUG ADON: CPT

## 2025-06-24 PROCEDURE — 93010 ELECTROCARDIOGRAM REPORT: CPT | Performed by: INTERNAL MEDICINE

## 2025-06-24 PROCEDURE — G0378 HOSPITAL OBSERVATION PER HR: HCPCS

## 2025-06-24 PROCEDURE — 63710000001 ONDANSETRON ODT 4 MG TABLET DISPERSIBLE: Performed by: STUDENT IN AN ORGANIZED HEALTH CARE EDUCATION/TRAINING PROGRAM

## 2025-06-24 PROCEDURE — 25010000002 HYDROMORPHONE PER 4 MG: Performed by: STUDENT IN AN ORGANIZED HEALTH CARE EDUCATION/TRAINING PROGRAM

## 2025-06-24 PROCEDURE — 93005 ELECTROCARDIOGRAM TRACING: CPT | Performed by: STUDENT IN AN ORGANIZED HEALTH CARE EDUCATION/TRAINING PROGRAM

## 2025-06-24 RX ORDER — OXCARBAZEPINE 150 MG/1
300 TABLET, FILM COATED ORAL NIGHTLY
Status: DISCONTINUED | OUTPATIENT
Start: 2025-06-24 | End: 2025-06-25 | Stop reason: HOSPADM

## 2025-06-24 RX ORDER — HYDROMORPHONE HYDROCHLORIDE 1 MG/ML
0.5 INJECTION, SOLUTION INTRAMUSCULAR; INTRAVENOUS; SUBCUTANEOUS EVERY 4 HOURS PRN
Status: DISCONTINUED | OUTPATIENT
Start: 2025-06-24 | End: 2025-06-25 | Stop reason: HOSPADM

## 2025-06-24 RX ORDER — SODIUM CHLORIDE 9 MG/ML
75 INJECTION, SOLUTION INTRAVENOUS CONTINUOUS
Status: ACTIVE | OUTPATIENT
Start: 2025-06-24 | End: 2025-06-24

## 2025-06-24 RX ORDER — ACYCLOVIR 200 MG/1
400 CAPSULE ORAL 3 TIMES DAILY
Status: DISCONTINUED | OUTPATIENT
Start: 2025-06-24 | End: 2025-06-25 | Stop reason: HOSPADM

## 2025-06-24 RX ADMIN — SIMETHICONE 80 MG: 80 TABLET, CHEWABLE ORAL at 12:23

## 2025-06-24 RX ADMIN — ZOLPIDEM TARTRATE 10 MG: 5 TABLET ORAL at 21:32

## 2025-06-24 RX ADMIN — HYDROCODONE BITARTRATE AND ACETAMINOPHEN 1 TABLET: 5; 325 TABLET ORAL at 17:50

## 2025-06-24 RX ADMIN — ACYCLOVIR 400 MG: 200 CAPSULE ORAL at 01:10

## 2025-06-24 RX ADMIN — DIPHENHYDRAMINE HYDROCHLORIDE 50 MG: 50 CAPSULE ORAL at 21:32

## 2025-06-24 RX ADMIN — PROPRANOLOL HYDROCHLORIDE 10 MG: 10 TABLET ORAL at 21:32

## 2025-06-24 RX ADMIN — ACYCLOVIR 400 MG: 200 CAPSULE ORAL at 21:33

## 2025-06-24 RX ADMIN — Medication 10 ML: at 08:22

## 2025-06-24 RX ADMIN — ACYCLOVIR 400 MG: 200 CAPSULE ORAL at 16:21

## 2025-06-24 RX ADMIN — ACYCLOVIR 400 MG: 200 CAPSULE ORAL at 11:00

## 2025-06-24 RX ADMIN — HYDROMORPHONE HYDROCHLORIDE 0.5 MG: 1 INJECTION, SOLUTION INTRAMUSCULAR; INTRAVENOUS; SUBCUTANEOUS at 14:28

## 2025-06-24 RX ADMIN — PANTOPRAZOLE SODIUM 40 MG: 40 TABLET, DELAYED RELEASE ORAL at 05:34

## 2025-06-24 RX ADMIN — HYDROCODONE BITARTRATE AND ACETAMINOPHEN 1 TABLET: 5; 325 TABLET ORAL at 12:16

## 2025-06-24 RX ADMIN — OXCARBAZEPINE 300 MG: 150 TABLET, FILM COATED ORAL at 21:45

## 2025-06-24 RX ADMIN — ESCITALOPRAM OXALATE 20 MG: 10 TABLET ORAL at 08:23

## 2025-06-24 RX ADMIN — DICYCLOMINE HYDROCHLORIDE 20 MG: 20 TABLET ORAL at 17:50

## 2025-06-24 RX ADMIN — SODIUM CHLORIDE 75 ML/HR: 9 INJECTION, SOLUTION INTRAVENOUS at 08:34

## 2025-06-24 RX ADMIN — DULOXETINE 30 MG: 30 CAPSULE, DELAYED RELEASE ORAL at 08:22

## 2025-06-24 RX ADMIN — Medication 10 ML: at 21:38

## 2025-06-24 RX ADMIN — DICYCLOMINE HYDROCHLORIDE 20 MG: 20 TABLET ORAL at 12:16

## 2025-06-24 RX ADMIN — VANCOMYCIN HYDROCHLORIDE 125 MG: 125 CAPSULE ORAL at 21:32

## 2025-06-24 RX ADMIN — DICYCLOMINE HYDROCHLORIDE 20 MG: 20 TABLET ORAL at 21:32

## 2025-06-24 RX ADMIN — OXCARBAZEPINE 300 MG: 150 TABLET, FILM COATED ORAL at 01:10

## 2025-06-24 RX ADMIN — VANCOMYCIN HYDROCHLORIDE 125 MG: 125 CAPSULE ORAL at 17:50

## 2025-06-24 RX ADMIN — HYDROMORPHONE HYDROCHLORIDE 0.5 MG: 1 INJECTION, SOLUTION INTRAMUSCULAR; INTRAVENOUS; SUBCUTANEOUS at 08:22

## 2025-06-24 RX ADMIN — PROPRANOLOL HYDROCHLORIDE 10 MG: 10 TABLET ORAL at 08:23

## 2025-06-24 RX ADMIN — DICYCLOMINE HYDROCHLORIDE 20 MG: 20 TABLET ORAL at 08:22

## 2025-06-24 RX ADMIN — VANCOMYCIN HYDROCHLORIDE 125 MG: 125 CAPSULE ORAL at 08:23

## 2025-06-24 RX ADMIN — VANCOMYCIN HYDROCHLORIDE 125 MG: 125 CAPSULE ORAL at 12:16

## 2025-06-24 RX ADMIN — OLANZAPINE 7.5 MG: 2.5 TABLET, FILM COATED ORAL at 21:32

## 2025-06-24 RX ADMIN — SPIRONOLACTONE 100 MG: 25 TABLET ORAL at 08:22

## 2025-06-24 RX ADMIN — ONDANSETRON 4 MG: 4 TABLET, ORALLY DISINTEGRATING ORAL at 17:51

## 2025-06-24 RX ADMIN — HYDROMORPHONE HYDROCHLORIDE 0.5 MG: 1 INJECTION, SOLUTION INTRAMUSCULAR; INTRAVENOUS; SUBCUTANEOUS at 20:06

## 2025-06-24 NOTE — PLAN OF CARE
Problem: Adult Inpatient Plan of Care  Goal: Plan of Care Review  Outcome: Progressing  Goal: Patient-Specific Goal (Individualized)  Outcome: Progressing  Goal: Absence of Hospital-Acquired Illness or Injury  Outcome: Progressing  Intervention: Identify and Manage Fall Risk  Recent Flowsheet Documentation  Taken 6/24/2025 0200 by Va Cruz RN  Safety Promotion/Fall Prevention:   fall prevention program maintained   safety round/check completed  Taken 6/24/2025 0000 by Va Cruz RN  Safety Promotion/Fall Prevention:   fall prevention program maintained   safety round/check completed  Taken 6/23/2025 2200 by Va Cruz RN  Safety Promotion/Fall Prevention:   fall prevention program maintained   safety round/check completed  Taken 6/23/2025 2000 by Va Cruz RN  Safety Promotion/Fall Prevention:   fall prevention program maintained   assistive device/personal items within reach   clutter free environment maintained   safety round/check completed  Intervention: Prevent Skin Injury  Recent Flowsheet Documentation  Taken 6/24/2025 0200 by Va Cruz RN  Body Position: position changed independently  Skin Protection: incontinence pads utilized  Taken 6/24/2025 0000 by Va Cruz RN  Body Position: position changed independently  Skin Protection: incontinence pads utilized  Taken 6/23/2025 2200 by Va Cruz RN  Body Position: position changed independently  Skin Protection: incontinence pads utilized  Taken 6/23/2025 2000 by Va Cruz RN  Body Position: position changed independently  Skin Protection: incontinence pads utilized  Intervention: Prevent Infection  Recent Flowsheet Documentation  Taken 6/24/2025 0200 by Va Cruz RN  Infection Prevention:   hand hygiene promoted   rest/sleep promoted   single patient room provided  Taken 6/24/2025 0000 by Va Cruz RN  Infection Prevention:   rest/sleep promoted   single patient room provided   hand  hygiene promoted  Taken 6/23/2025 2200 by Va Cruz RN  Infection Prevention:   hand hygiene promoted   rest/sleep promoted   single patient room provided   environmental surveillance performed  Taken 6/23/2025 2000 by Va Cruz RN  Infection Prevention:   environmental surveillance performed   hand hygiene promoted   rest/sleep promoted   single patient room provided  Goal: Optimal Comfort and Wellbeing  Outcome: Progressing  Intervention: Provide Person-Centered Care  Recent Flowsheet Documentation  Taken 6/23/2025 2000 by Va Cruz RN  Trust Relationship/Rapport:   care explained   choices provided   questions answered   questions encouraged   thoughts/feelings acknowledged  Goal: Readiness for Transition of Care  Outcome: Progressing     Problem: Skin Injury Risk Increased  Goal: Skin Health and Integrity  Outcome: Progressing  Intervention: Optimize Skin Protection  Recent Flowsheet Documentation  Taken 6/24/2025 0200 by Va Cruz RN  Activity Management: activity encouraged  Pressure Reduction Techniques:   frequent weight shift encouraged   heels elevated off bed   weight shift assistance provided  Head of Bed (HOB) Positioning: [facility] elevated  Pressure Reduction Devices: pressure-redistributing mattress utilized  Skin Protection: incontinence pads utilized  Taken 6/24/2025 0000 by Va Cruz RN  Activity Management: activity minimized  Pressure Reduction Techniques:   frequent weight shift encouraged   heels elevated off bed   weight shift assistance provided  Head of Bed (HOB) Positioning: HOB elevated  Pressure Reduction Devices: pressure-redistributing mattress utilized  Skin Protection: incontinence pads utilized  Taken 6/23/2025 2200 by Va Cruz RN  Activity Management:   activity encouraged   up ad richardson  Pressure Reduction Techniques:   frequent weight shift encouraged   heels elevated off bed   weight shift assistance provided  Head of Bed (HOB)  Positioning: [facility] elevated  Pressure Reduction Devices: pressure-redistributing mattress utilized  Skin Protection: incontinence pads utilized  Taken 6/23/2025 2000 by Va Cruz, RN  Activity Management:   up ad richardson   activity encouraged  Pressure Reduction Techniques:   frequent weight shift encouraged   heels elevated off bed   weight shift assistance provided  Head of Bed ([facility]) Positioning: [facility] elevated  Pressure Reduction Devices: pressure-redistributing mattress utilized  Skin Protection: incontinence pads utilized     Problem: Comorbidity Management  Goal: Maintenance of Behavioral Health Symptom Control  Outcome: Progressing  Goal: Blood Pressure in Desired Range  Outcome: Progressing   Goal Outcome Evaluation:         Patient resting quietly in bed, no signs of distress noted at this time, VSS, NSR on tele, call light within reach.

## 2025-06-24 NOTE — CASE MANAGEMENT/SOCIAL WORK
Discharge Planning Assessment  [facility]     Patient Name: Hao Platt  MRN: [id number]  Today's Date: 6/24/2025    Admit Date: 6/23/2025    Plan: home   Discharge Needs Assessment       Row Name 06/24/25 1415       Living Environment    People in Home spouse;child(eugenio), dependent    Name(s) of People in Home Spouse and 3 children ages 14, 12, and 8.    Current Living Arrangements home    Potentially Unsafe Housing Conditions none    Primary Care Provided by self    Provides Primary Care For child(eugenio)    Family Caregiver if Needed spouse    Quality of Family Relationships unable to assess    Able to Return to Prior Arrangements yes       Transition Planning    Patient/Family Anticipates Transition to home with family    Patient/Family Anticipated Services at Transition none    Transportation Anticipated family or friend will provide       Discharge Needs Assessment    Readmission Within the Last 30 Days no previous admission in last 30 days    Equipment Currently Used at Home none    Concerns to be Addressed denies needs/concerns at this time;no discharge needs identified    Equipment Needed After Discharge none    Discharge Coordination/Progress Pt lives in [place] with her spouse and 3 children.  Pt stated she was independent prior to discharge.  Pt doesn't use DME, HH, or OP therapy services.  Pt's spouse will provide transportation at discharge.                   Discharge Plan       Row Name 06/24/25 1419       Plan    Plan home    Patient/Family in Agreement with Plan yes    Plan Comments SW met with pt at bedside.  No visitors were present.  Pt plans to return home at discharge.  Pt denies having any discharge needs at this time.    Final Discharge Disposition Code 01 - home or self-care                       Demographic Summary       Row Name 06/24/25 1406       General Information    Admission Type observation    Preferred Language English    General Information Comments PCP is Duy Baltazar                    Functional Status    No documentation.                  Psychosocial    No documentation.                  Abuse/Neglect    No documentation.                  Legal    No documentation.                  Substance Abuse    No documentation.                  Patient Forms    No documentation.                     Carol Stacy MSW

## 2025-06-24 NOTE — PLAN OF CARE
Problem: Adult Inpatient Plan of Care  Goal: Plan of Care Review  Outcome: Progressing  Goal: Patient-Specific Goal (Individualized)  Outcome: Progressing  Goal: Absence of Hospital-Acquired Illness or Injury  Outcome: Progressing  Intervention: Identify and Manage Fall Risk  Recent Flowsheet Documentation  Taken 6/24/2025 1820 by Rosa Isela Damon RN  Safety Promotion/Fall Prevention:   activity supervised   clutter free environment maintained   assistive device/personal items within reach   fall prevention program maintained   toileting scheduled   safety round/check completed   room organization consistent   nonskid shoes/slippers when out of bed  Intervention: Prevent Skin Injury  Recent Flowsheet Documentation  Taken 6/24/2025 1820 by Rosa Isela Damon RN  Body Position: position changed independently  Skin Protection:   incontinence pads utilized   protective footwear used   transparent dressing maintained  Intervention: Prevent Infection  Recent Flowsheet Documentation  Taken 6/24/2025 1820 by Rosa Isela Damon RN  Infection Prevention:   environmental surveillance performed   hand hygiene promoted   rest/sleep promoted   single patient room provided  Goal: Optimal Comfort and Wellbeing  Outcome: Progressing  Goal: Readiness for Transition of Care  Outcome: Progressing     Problem: Skin Injury Risk Increased  Goal: Skin Health and Integrity  Outcome: Progressing  Intervention: Optimize Skin Protection  Recent Flowsheet Documentation  Taken 6/24/2025 1820 by Rosa Isela Damon RN  Activity Management: activity encouraged  Pressure Reduction Techniques:   frequent weight shift encouraged   pressure points protected  Head of Bed (HOB) Positioning: HOB elevated  Pressure Reduction Devices: pressure-redistributing mattress utilized  Skin Protection:   incontinence pads utilized   protective footwear used   transparent dressing maintained     Problem: Comorbidity Management  Goal: Maintenance of Behavioral Health Symptom  Control  Outcome: Progressing  Goal: Blood Pressure in Desired Range  Outcome: Progressing     Problem: Fall Injury Risk  Goal: Absence of Fall and Fall-Related Injury  Outcome: Progressing  Intervention: Identify and Manage Contributors  Recent Flowsheet Documentation  Taken 6/24/2025 1820 by Rosa Isela Damon RN  Self-Care Promotion: independence encouraged  Intervention: Promote Injury-Free Environment  Recent Flowsheet Documentation  Taken 6/24/2025 1820 by Rosa Isela Damon, RN  Safety Promotion/Fall Prevention:   activity supervised   clutter free environment maintained   assistive device/personal items within reach   fall prevention program maintained   toileting scheduled   safety round/check completed   room organization consistent   nonskid shoes/slippers when out of bed   Goal Outcome Evaluation:

## 2025-06-24 NOTE — PROGRESS NOTES
[facility] Medicine Services  PROGRESS NOTE    Patient Name: Hao Platt  : 1980  MRN: [id number]    Date of Admission: 2025  Primary Care Physician: Duy Baltazar MD    Subjective   Subjective     CC:  Following for nausea, diarrhea    HPI:  Awake in bed.  Having a lot of lower abdominal pain/cramping this morning.  Was able to sleep throughout the night.  No diarrhea overnight.  Able to keep food/drink down but appetite not great.      Objective   Objective     Vital Signs:   Temp:  [97.6 °F (36.4 °C)-98.1 °F (36.7 °C)] 98.1 °F (36.7 °C)  Heart Rate:  [59-79] 75  Resp:  [16-20] 16  BP: (112-135)/(68-97) 116/73     Physical Exam:  Constitutional: No acute distress, awake, alert  HENT: NCAT, mucous membranes moist  Respiratory: Clear to auscultation bilaterally, respiratory effort normal   Cardiovascular: RRR, no murmurs, rubs, or gallops  Gastrointestinal: Positive bowel sounds, soft, nontender, nondistended  Musculoskeletal: No bilateral ankle edema  Psychiatric: Appropriate affect, cooperative  Neurologic: Oriented x 3, strength symmetric in all extremities, Cranial Nerves grossly intact to confrontation, speech clear  Skin: No rashes    Results Reviewed:  LAB RESULTS:      Lab 25  0840 25  1038   WBC 8.60 14.44*   HEMOGLOBIN 14.2 13.9   HEMATOCRIT 41.9 41.4   PLATELETS 203 176   NEUTROS ABS 5.97 10.98*   IMMATURE GRANS (ABS) 0.10* 0.05   LYMPHS ABS 1.87 2.01   MONOS ABS 0.62 1.35*   EOS ABS 0.00 0.01   MCV 83.0 83.1   CRP  --  4.32*   PROCALCITONIN  --  0.06   LACTATE 0.8 1.4         Lab 25  0418 25  0840 25  1038   SODIUM 137 137 137   POTASSIUM 4.3 4.5 4.4   CHLORIDE 104 102 103   CO2 24.2 23.0 23.0   ANION GAP 8.8 12.0 11.0   BUN 8.0 5.3* 8.2   CREATININE 0.88 0.77 0.90   EGFR 83.2 97.7 81.0   GLUCOSE 97 112* 101*   CALCIUM 8.6 9.4 9.3         Lab 25  0418 25  0840 25  1038   TOTAL PROTEIN 5.7* 7.2 7.0   ALBUMIN  3.4* 4.2 4.1   GLOBULIN 2.3 3.0 2.9   ALT (SGPT) 31 36* 25   AST (SGOT) 22 23 17   BILIRUBIN 0.2 0.3 0.5   ALK PHOS 90 116 111   LIPASE  --  21 20                     Brief Urine Lab Results  (Last result in the past 365 days)        Color   Clarity   Blood   Leuk Est   Nitrite   Protein   CREAT   Urine HCG        06/23/25 0840 Yellow   Clear   Negative   Negative   Negative   Negative                   Microbiology Results Abnormal       None            CT Abdomen Pelvis Without Contrast  Result Date: 6/23/2025  CT ABDOMEN PELVIS WO CONTRAST Date of Exam: 6/23/2025 9:00 AM EDT Indication: worsening diffuse lower abdominal pain, nausea, diarrhea, recent CT showing colitis. Comparison: CT abdomen pelvis 6/20/2025. Technique: Axial CT images were obtained of the abdomen and pelvis without the administration of contrast. Reconstructed coronal and sagittal images were also obtained. Automated exposure control and iterative construction methods were used. Findings: Visualized lung bases appear clear without focal airspace consolidation. No pleural or pericardial effusion. No morphologic changes of chronic liver disease. Cyst in the lateral left hepatic lobe. Cholecystectomy. No biliary ductal dilatation. No findings of acute pancreatitis. Spleen at upper limits of normal size, unchanged. No distinct adrenal nodule. Kidneys are symmetric in size without hydronephrosis. Urinary bladder is unremarkable. Hysterectomy. Mobile cecum positioned in the anterior mid abdomen. Appendectomy. Gastric sleeve. Small hiatal hernia. No bowel obstruction. No free air. No free fluid or organized fluid collection. Couple mildly prominent lymph nodes within the mesentery and also adjacent to the cecum, unchanged and nonspecific. No new or progressive lymphadenopathy. No abdominal aortic aneurysm. No body wall abnormality. No acute or suspicious osseous lesion.     Impression: Impression: Resolved findings of acute sigmoid colitis seen on  recent exam. Mildly mobile cecum positioned in the anterior mid abdomen. No evidence of bowel obstruction. Couple mildly prominent lymph nodes within the mesentery and also adjacent to the cecum, unchanged and nonspecific. No new or progressive lymphadenopathy. Electronically Signed: Brian Plaza MD  6/23/2025 9:31 AM EDT  Workstation ID: [id number]          Current medications:  Scheduled Meds:acyclovir, 400 mg, Oral, TID  dicyclomine, 20 mg, Oral, 4x Daily  diphenhydrAMINE, 50 mg, Oral, Nightly  DULoxetine, 30 mg, Oral, Daily  escitalopram, 20 mg, Oral, Daily  OLANZapine, 7.5 mg, Oral, Nightly  OXcarbazepine, 300 mg, Oral, Nightly  pantoprazole, 40 mg, Oral, Q AM  propranolol, 10 mg, Oral, BID  sodium chloride, 10 mL, Intravenous, Q12H  spironolactone, 100 mg, Oral, Daily  vancomycin, 125 mg, Oral, 4x Daily  zolpidem, 10 mg, Oral, Nightly      Continuous Infusions:   PRN Meds:.  acetaminophen **OR** acetaminophen **OR** acetaminophen    Calcium Replacement - Follow Nurse / BPA Driven Protocol    fluticasone    HYDROcodone-acetaminophen    LORazepam    Magnesium Standard Dose Replacement - Follow Nurse / BPA Driven Protocol    Morphine    nitroglycerin    ondansetron ODT    Phosphorus Replacement - Follow Nurse / BPA Driven Protocol    Potassium Replacement - Follow Nurse / BPA Driven Protocol    simethicone    [COMPLETED] Insert Peripheral IV **AND** sodium chloride    sodium chloride    sodium chloride    Assessment & Plan   Assessment & Plan     Active Hospital Problems    Diagnosis  POA    **Dehydration [E86.0]  Yes      Resolved Hospital Problems   No resolved problems to display.        Brief Hospital Course to date:  Hao Platt is a 44 y.o. female diagnosed with C.difficile colitis/norovius here at [facility] 6/20/2025 and sent home with outpatient Rx oral vancomycin 125mg Q6 x 10 days.  She had recently taken Augmentin for sinus infection, likely precipitating Cdiff colitis.  She presents back to [facility] ED  with worsening abdominal pain, persistent nausea and diarrhea.  Admitted for management of GI symptoms. Vancomycin was continued along with IVF and bentyl.    Lower Abdominal Pain  Nausea  Persistent Diarrhea  C.difficile colitis/Norovirus, diagnosed 6/20/2025  - Normal Electrolytes.  Normal LFTs.  WBC normal/down from 3 days ago  - CT Abd/Pelvis shows resolved findings of acute sigmoid colitis  - Admit for pain control, fluids/hydration. Continue IV fluids, Bentyl.  Will order heating pad.  Ambulate/mobilize.  - Continue oral Vancomycin 125mg Q6 hours x10 days (first dose 6/20/2025).  - Allow for diet as tolerated  - Isolation for CDiff.         GERD  -Continue PPI     Obesity  -S/P laparoscopic sleeve gastrectomy (Dr. Barnhart, 12-)   -Body mass index is 43.27 kg/m².  - Flex Sig 2/5/2025: no post op strictures, mod GERD, mild esophageal dysmotility, small sliding-type hiatal hernia     Anxiety/Depression  -continue home medications.          Expected Discharge Location and Transportation: Home with family   Expected Discharge 6/25/2025      VTE Prophylaxis:  Mechanical VTE prophylaxis orders are present.         AM-PAC 6 Clicks Score (PT): 24 (06/23/25 2000)    CODE STATUS:   Code Status and Medical Interventions: CPR (Attempt to Resuscitate); Full Support   Ordered at: 06/23/25 1204     Code Status (Patient has no pulse and is not breathing):    CPR (Attempt to Resuscitate)     Medical Interventions (Patient has pulse or is breathing):    Full Support     Level Of Support Discussed With:    Patient       Ira CHURCH LUANA Hobbs  06/24/25

## 2025-06-25 VITALS
RESPIRATION RATE: 16 BRPM | DIASTOLIC BLOOD PRESSURE: 78 MMHG | BODY MASS INDEX: 43.32 KG/M2 | TEMPERATURE: 98.3 F | OXYGEN SATURATION: 93 % | HEART RATE: 75 BPM | HEIGHT: 65 IN | WEIGHT: 260 LBS | SYSTOLIC BLOOD PRESSURE: 121 MMHG

## 2025-06-25 PROCEDURE — 99239 HOSP IP/OBS DSCHRG MGMT >30: CPT | Performed by: PHYSICIAN ASSISTANT

## 2025-06-25 PROCEDURE — G0378 HOSPITAL OBSERVATION PER HR: HCPCS

## 2025-06-25 RX ORDER — DICYCLOMINE HCL 20 MG
20 TABLET ORAL 4 TIMES DAILY
Qty: 40 TABLET | Refills: 0 | Status: SHIPPED | OUTPATIENT
Start: 2025-06-25 | End: 2025-07-05

## 2025-06-25 RX ADMIN — ESCITALOPRAM OXALATE 20 MG: 10 TABLET ORAL at 09:24

## 2025-06-25 RX ADMIN — DICYCLOMINE HYDROCHLORIDE 20 MG: 20 TABLET ORAL at 11:07

## 2025-06-25 RX ADMIN — VANCOMYCIN HYDROCHLORIDE 125 MG: 125 CAPSULE ORAL at 11:07

## 2025-06-25 RX ADMIN — Medication 10 ML: at 09:27

## 2025-06-25 RX ADMIN — SPIRONOLACTONE 100 MG: 25 TABLET ORAL at 09:24

## 2025-06-25 RX ADMIN — DULOXETINE 30 MG: 30 CAPSULE, DELAYED RELEASE ORAL at 09:24

## 2025-06-25 RX ADMIN — DICYCLOMINE HYDROCHLORIDE 20 MG: 20 TABLET ORAL at 09:24

## 2025-06-25 RX ADMIN — PROPRANOLOL HYDROCHLORIDE 10 MG: 10 TABLET ORAL at 09:24

## 2025-06-25 RX ADMIN — ACYCLOVIR 400 MG: 200 CAPSULE ORAL at 09:24

## 2025-06-25 RX ADMIN — HYDROCODONE BITARTRATE AND ACETAMINOPHEN 1 TABLET: 5; 325 TABLET ORAL at 09:32

## 2025-06-25 RX ADMIN — VANCOMYCIN HYDROCHLORIDE 125 MG: 125 CAPSULE ORAL at 09:24

## 2025-06-25 RX ADMIN — PANTOPRAZOLE SODIUM 40 MG: 40 TABLET, DELAYED RELEASE ORAL at 06:26

## 2025-06-25 NOTE — CASE MANAGEMENT/SOCIAL WORK
Case Management Discharge Note      Final Note: Pt will discharge home with family today.  Spouse will provide transportation.  No discharge needs identified or reported by pt.         Selected Continued Care - Admitted Since 6/23/2025       Destination    No services have been selected for the patient.                Durable Medical Equipment    No services have been selected for the patient.                Dialysis/Infusion    No services have been selected for the patient.                Home Medical Care    No services have been selected for the patient.                Therapy    No services have been selected for the patient.                Community Resources    No services have been selected for the patient.                Community & DME    No services have been selected for the patient.                         Final Discharge Disposition Code: 01 - home or self-care

## 2025-06-25 NOTE — DISCHARGE SUMMARY
[facility]  DISCHARGE SUMMARY    Patient Name: Hao Platt  : 1980  MRN: [id number]    Date of Admission: 2025  8:19 AM  Date of Discharge:  2025  Primary Care Physician: Duy Baltazar MD    Consults       No orders found from 2025 to 2025.            Hospital Course     Presenting Problem: Diarrhea, dehydration    Active Hospital Problems    Diagnosis  POA    **Dehydration [E86.0]  Yes      Resolved Hospital Problems   No resolved problems to display.          Hospital Course:  Hao Platt is a 44 y.o. female  diagnosed with C.difficile colitis/norovius here at [facility] 2025 and sent home with outpatient Rx oral vancomycin 125mg Q6 x 10 days.  She had recently taken Augmentin for sinus infection, likely precipitating Cdiff colitis.  She presents back to [facility] ED with worsening abdominal pain, persistent nausea and diarrhea.  Admitted for management of GI symptoms. Vancomycin was continued along with IVF and bentyl.     Lower Abdominal Pain  Nausea  Persistent Diarrhea  C.difficile colitis/Norovirus, diagnosed 2025  - Normal Electrolytes.  Normal LFTs.  WBC normal/down from   - CT Abd/Pelvis shows resolved findings of acute sigmoid colitis  - Admitted for pain control, fluids/hydration - IV fluids, Bentyl.    - Continue oral Vancomycin 125mg Q6 hours x10 days (first dose 2025).      GERD  -Continue PPI     Obesity  -S/P laparoscopic sleeve gastrectomy (Dr. Barnhart, 2017)   -Body mass index is 43.27 kg/m².  - Flex Sig 2025: no post op strictures, mod GERD, mild esophageal dysmotility, small sliding-type hiatal hernia     Anxiety/Depression  -continue home medications.             Discharge Follow Up Recommendations for outpatient labs/diagnostics:   1.  Follow up with PCP in 1 week.       Day of Discharge     HPI:   Still with mild nausea and abdominal cramping but much improved.  She would like to go  home.    Review of Systems   Gastrointestinal:  Positive for abdominal pain and nausea. Negative for constipation and vomiting.   All other systems reviewed and are negative.        Vital Signs:   Temp:  [97.4 °F (36.3 °C)-98.3 °F (36.8 °C)] 98.3 °F (36.8 °C)  Heart Rate:  [57-75] 75  Resp:  [16] 16  BP: (113-131)/(67-92) 121/78      Physical Exam:  Constitutional: No acute distress, awake, alert  HENT: NCAT, mucous membranes moist  Respiratory: Clear to auscultation bilaterally, respiratory effort normal   Cardiovascular: RRR, no murmurs, rubs, or gallops  Gastrointestinal: Positive bowel sounds, soft, nontender, nondistended  Musculoskeletal: No bilateral ankle edema  Psychiatric: Appropriate affect, cooperative  Neurologic: Oriented x 3, strength symmetric in all extremities, Cranial Nerves grossly intact to confrontation, speech clear  Skin: No rashes    Pertinent  and/or Most Recent Results     LAB RESULTS:      Lab 06/23/25  0840 06/20/25  1038   WBC 8.60 14.44*   HEMOGLOBIN 14.2 13.9   HEMATOCRIT 41.9 41.4   PLATELETS 203 176   NEUTROS ABS 5.97 10.98*   IMMATURE GRANS (ABS) 0.10* 0.05   LYMPHS ABS 1.87 2.01   MONOS ABS 0.62 1.35*   EOS ABS 0.00 0.01   MCV 83.0 83.1   CRP  --  4.32*   PROCALCITONIN  --  0.06   LACTATE 0.8 1.4         Lab 06/24/25  0418 06/23/25  0840 06/20/25  1038   SODIUM 137 137 137   POTASSIUM 4.3 4.5 4.4   CHLORIDE 104 102 103   CO2 24.2 23.0 23.0   ANION GAP 8.8 12.0 11.0   BUN 8.0 5.3* 8.2   CREATININE 0.88 0.77 0.90   EGFR 83.2 97.7 81.0   GLUCOSE 97 112* 101*   CALCIUM 8.6 9.4 9.3         Lab 06/24/25  0418 06/23/25  0840 06/20/25  1038   TOTAL PROTEIN 5.7* 7.2 7.0   ALBUMIN 3.4* 4.2 4.1   GLOBULIN 2.3 3.0 2.9   ALT (SGPT) 31 36* 25   AST (SGOT) 22 23 17   BILIRUBIN 0.2 0.3 0.5   ALK PHOS 90 116 111   LIPASE  --  21 20                     Brief Urine Lab Results  (Last result in the past 365 days)        Color   Clarity   Blood   Leuk Est   Nitrite   Protein   CREAT   Urine HCG         06/23/25 0840 Yellow   Clear   Negative   Negative   Negative   Negative                 Microbiology Results (last 10 days)       Procedure Component Value - Date/Time    Gastrointestinal Panel, PCR - Stool, Per Rectum [[id number]]  (Abnormal) Collected: 06/20/25 1039    Lab Status: Final result Specimen: Stool from Per Rectum Updated: 06/20/25 1337     Campylobacter Not Detected     Plesiomonas shigelloides Not Detected     Salmonella Not Detected     Vibrio Not Detected     Vibrio cholerae Not Detected     Yersinia enterocolitica Not Detected     Enteroaggregative E. coli (EAEC) Not Detected     Enteropathogenic E. coli (EPEC) Not Detected     Enterotoxigenic E. coli (ETEC) lt/st Not Detected     Shiga-like toxin-producing E. coli (STEC) stx1/stx2 Not Detected     Shigella/Enteroinvasive E. coli (EIEC) Not Detected     Cryptosporidium Not Detected     Cyclospora cayetanensis Not Detected     Entamoeba histolytica Not Detected     Giardia lamblia Not Detected     Adenovirus F40/41 Not Detected     Astrovirus Not Detected     Norovirus GI/GII Detected     Comment: If a positive Norovirus result is inconsistent with clinical presentation, the positive Norovirus result should be confirmed using another method.        Rotavirus A Not Detected     Sapovirus (I, II, IV or V) Not Detected    Clostridioides difficile Toxin - Stool, Per Rectum [[id number]]  (Abnormal) Collected: 06/20/25 1039    Lab Status: Final result Specimen: Stool from Per Rectum Updated: 06/20/25 1237    Narrative:      The following orders were created for panel order Clostridioides difficile Toxin - Stool, Per Rectum.  Procedure                               Abnormality         Status                     ---------                               -----------         ------                     Clostridioides difficile...[[id number]]  Abnormal            Final result                 Please view results for these tests on the individual orders.     Clostridioides difficile Toxin, PCR - Stool, Per Rectum [[id number]]  (Abnormal) Collected: 06/20/25 1039    Lab Status: Final result Specimen: Stool from Per Rectum Updated: 06/20/25 1237     Toxigenic C. difficile by PCR Detected    Narrative:      DNA from a toxigenic strain of C.difficile has been detected.    Clostridioides difficile toxin Ag, Reflex - Stool, Per Rectum [[id number]]  (Abnormal) Collected: 06/20/25 1039    Lab Status: Final result Specimen: Stool from Per Rectum Updated: 06/20/25 1409     C.diff Toxin Ag Positive    Narrative:      DNA from a toxigenic strain of C.difficile was detected, along with the presence of free toxin. These results are suggestive of C.difficile infection.            CT Abdomen Pelvis Without Contrast  Result Date: 6/23/2025  CT ABDOMEN PELVIS WO CONTRAST Date of Exam: 6/23/2025 9:00 AM EDT Indication: worsening diffuse lower abdominal pain, nausea, diarrhea, recent CT showing colitis. Comparison: CT abdomen pelvis 6/20/2025. Technique: Axial CT images were obtained of the abdomen and pelvis without the administration of contrast. Reconstructed coronal and sagittal images were also obtained. Automated exposure control and iterative construction methods were used. Findings: Visualized lung bases appear clear without focal airspace consolidation. No pleural or pericardial effusion. No morphologic changes of chronic liver disease. Cyst in the lateral left hepatic lobe. Cholecystectomy. No biliary ductal dilatation. No findings of acute pancreatitis. Spleen at upper limits of normal size, unchanged. No distinct adrenal nodule. Kidneys are symmetric in size without hydronephrosis. Urinary bladder is unremarkable. Hysterectomy. Mobile cecum positioned in the anterior mid abdomen. Appendectomy. Gastric sleeve. Small hiatal hernia. No bowel obstruction. No free air. No free fluid or organized fluid collection. Couple mildly prominent lymph nodes within the mesentery and also  adjacent to the cecum, unchanged and nonspecific. No new or progressive lymphadenopathy. No abdominal aortic aneurysm. No body wall abnormality. No acute or suspicious osseous lesion.     Impression: Resolved findings of acute sigmoid colitis seen on recent exam. Mildly mobile cecum positioned in the anterior mid abdomen. No evidence of bowel obstruction. Couple mildly prominent lymph nodes within the mesentery and also adjacent to the cecum, unchanged and nonspecific. No new or progressive lymphadenopathy. Electronically Signed: Brian Plaza MD  6/23/2025 9:31 AM EDT  Workstation ID: [id number]    CT Abdomen Pelvis With Contrast  Result Date: 6/20/2025  CT ABDOMEN PELVIS W CONTRAST Date of Exam: 6/20/2025 11:21 AM EDT Indication: abd pain, diarrhea, Hx of gastic sleeve. Comparison: None available. Technique: Axial CT images were obtained of the abdomen and pelvis following the uneventful intravenous administration of 85 cc Isovue-300. Reconstructed coronal and sagittal images were also obtained. Automated exposure control and iterative construction methods were used. Findings: Visualized Chest:  The visualized lung bases and lower mediastinal structures are unremarkable. Liver: Liver is normal in size and CT density. No focal lesions. Gallbladder: Status post cholecystectomy Bile Ducts: No billiary dcutal dilation. Spleen: Spleen is normal in size and CT density. Pancreas: Pancreas is normal. There is no evidence of pancreatic mass or peripancreatic fluid. Adrenals: Adrenal glands are unremarkable. Kidneys: Kidneys are normal in size. There are no stones or hydronephrosis. Gastrointestinal: Status post gastric sleeve procedure. Mild wall thickening of the sigmoid colon. There is also liquid stool noted throughout the colon. Otherwise the visualized GI tract is unremarkable Bladder: The bladder is normal. Pelvis:  No suspecious mass. Peritoneum/Mesentery: No fluid collection, ascities, or free air.   Lymph Nodes: No  lymphadenopathy. Vasculature: Unremarkable Abdominal Wall: Unremarkable Bony Structures: No acute osseous abnormality     Impression: 1.Mild wall thickening of the sigmoid colon. There is also liquid stool noted throughout the colon. These findings are suggestive of a nonspecific colitis. 2.Status post gastric sleeve procedure. 3.Status post cholecystectomy. Electronically Signed: Parth Cartwright,   6/20/2025 11:48 AM EDT  Workstation ID: [id number]                  Plan for Follow-up of Pending Labs/Results: No pending labs.    Discharge Details        Discharge Medications        New Medications        Instructions Start Date   dicyclomine 20 MG tablet  Commonly known as: BENTYL   20 mg, Oral, 4 Times Daily      PHARMACY MEDS TO BED CONSULT   Not Applicable, Daily             Continue These Medications        Instructions Start Date   acyclovir 400 MG tablet  Commonly known as: ZOVIRAX   400 mg, 2 Times Daily      ALPRAZolam 2 MG tablet  Commonly known as: XANAX   2 mg, As Needed      Ambien 10 MG tablet  Generic drug: zolpidem   10 mg, Nightly      BENADRYL ALLERGY PO   50 mg, Nightly      DULoxetine 30 MG capsule  Commonly known as: CYMBALTA   30 mg, Daily      escitalopram 20 MG tablet  Commonly known as: LEXAPRO   20 mg, Daily      fluticasone 50 MCG/ACT nasal spray  Commonly known as: FLONASE   2 sprays, Daily PRN      OLANZapine 7.5 MG tablet  Commonly known as: zyPREXA   7.5 mg, Nightly      omeprazole 20 MG capsule  Commonly known as: priLOSEC   20 mg, 2 Times Daily      ondansetron 4 MG tablet  Commonly known as: ZOFRAN   4 mg, Oral, Every 8 Hours PRN      OXcarbazepine 150 MG tablet  Commonly known as: TRILEPTAL   150 mg, 2 Times Daily      propranolol 10 MG tablet  Commonly known as: INDERAL   10 mg, 2 Times Daily      spironolactone 50 MG tablet  Commonly known as: ALDACTONE   100 mg, Daily      vancomycin 125 MG capsule  Commonly known as: VANCOCIN   125 mg, Oral, 4 Times Daily             Stop  These Medications      HYDROcodone-acetaminophen 5-325 MG per tablet  Commonly known as: NORCO              Allergies   Allergen Reactions    Lamotrigine Rash         Discharge Disposition: Home in stable condition with family.  Home or Self Care    Diet:  Diet Order   Procedures    Diet: Regular/House; Texture: Regular (IDDSI 7); Fluid Consistency: Thin (IDDSI 0)            Activity: As tolerated      Restrictions or Other Recommendations:   Complete full course of Vancomycin.   Push fluids, electrolytes       CODE STATUS:    Code Status and Medical Interventions: CPR (Attempt to Resuscitate); Full Support   Ordered at: 06/23/25 1204     Code Status (Patient has no pulse and is not breathing):    CPR (Attempt to Resuscitate)     Medical Interventions (Patient has pulse or is breathing):    Full Support     Level Of Support Discussed With:    Patient       No future appointments.              Ira Hobbs PA-C  06/25/25      Time Spent on Discharge:  I spent  35  minutes on this discharge activity which included: face-to-face encounter with the patient, reviewing the data in the system, coordination of the care with the nursing staff as well as consultants, documentation, and entering orders.

## 2025-06-25 NOTE — PLAN OF CARE
Goal Outcome Evaluation:  Patient rested quietly  and vitals remained stable through out the shift.

## 2025-07-08 ENCOUNTER — HOSPITAL ENCOUNTER (EMERGENCY)
Facility: HOSPITAL | Age: 45
Discharge: HOME | End: 2025-07-08
Attending: EMERGENCY MEDICINE | Admitting: EMERGENCY MEDICINE
Payer: COMMERCIAL

## 2025-07-08 ENCOUNTER — APPOINTMENT (OUTPATIENT)
Dept: CT IMAGING | Facility: HOSPITAL | Age: 45
End: 2025-07-08
Payer: COMMERCIAL

## 2025-07-08 VITALS
OXYGEN SATURATION: 96 % | WEIGHT: 250 LBS | HEIGHT: 65 IN | RESPIRATION RATE: 20 BRPM | SYSTOLIC BLOOD PRESSURE: 133 MMHG | TEMPERATURE: 98.7 F | DIASTOLIC BLOOD PRESSURE: 86 MMHG | BODY MASS INDEX: 41.65 KG/M2 | HEART RATE: 66 BPM

## 2025-07-08 DIAGNOSIS — R10.84 GENERALIZED ABDOMINAL PAIN: ICD-10-CM

## 2025-07-08 DIAGNOSIS — A04.72 C. DIFFICILE COLITIS: Primary | ICD-10-CM

## 2025-07-08 LAB
ADV 40+41 DNA STL QL NAA+NON-PROBE: NOT DETECTED
ALBUMIN SERPL-MCNC: 4 G/DL (ref 3.5–5.2)
ALBUMIN/GLOB SERPL: 1.5 G/DL
ALP SERPL-CCNC: 111 U/L (ref 39–117)
ALT SERPL W P-5'-P-CCNC: 19 U/L (ref 1–33)
ANION GAP SERPL CALCULATED.3IONS-SCNC: 10 MMOL/L (ref 5–15)
AST SERPL-CCNC: 14 U/L (ref 1–32)
ASTRO TYP 1-8 RNA STL QL NAA+NON-PROBE: NOT DETECTED
BASOPHILS # BLD AUTO: 0.03 10*3/MM3 (ref 0–0.2)
BASOPHILS NFR BLD AUTO: 0.3 % (ref 0–1.5)
BILIRUB SERPL-MCNC: 0.5 MG/DL (ref 0–1.2)
BILIRUB UR QL STRIP: NEGATIVE
BUN SERPL-MCNC: 5.5 MG/DL (ref 6–20)
BUN/CREAT SERPL: 5.9 (ref 7–25)
C CAYETANENSIS DNA STL QL NAA+NON-PROBE: NOT DETECTED
C COLI+JEJ+UPSA DNA STL QL NAA+NON-PROBE: NOT DETECTED
C DIFF GDH + TOXINS A+B STL QL IA.RAPID: POSITIVE
C DIFF TOX GENS STL QL NAA+PROBE: DETECTED
CALCIUM SPEC-SCNC: 9.5 MG/DL (ref 8.6–10.5)
CHLORIDE SERPL-SCNC: 99 MMOL/L (ref 98–107)
CLARITY UR: CLEAR
CO2 SERPL-SCNC: 26 MMOL/L (ref 22–29)
COLOR UR: YELLOW
CREAT BLDA-MCNC: 0.9 MG/DL (ref 0.6–1.3)
CREAT SERPL-MCNC: 0.93 MG/DL (ref 0.57–1)
CRYPTOSP DNA STL QL NAA+NON-PROBE: NOT DETECTED
D-LACTATE SERPL-SCNC: 1.6 MMOL/L (ref 0.5–2)
DEPRECATED RDW RBC AUTO: 41.2 FL (ref 37–54)
E HISTOLYT DNA STL QL NAA+NON-PROBE: NOT DETECTED
EAEC PAA PLAS AGGR+AATA ST NAA+NON-PRB: NOT DETECTED
EC STX1+STX2 GENES STL QL NAA+NON-PROBE: NOT DETECTED
EGFRCR SERPLBLD CKD-EPI 2021: 77.9 ML/MIN/1.73
EOSINOPHIL # BLD AUTO: 0.01 10*3/MM3 (ref 0–0.4)
EOSINOPHIL NFR BLD AUTO: 0.1 % (ref 0.3–6.2)
EPEC EAE GENE STL QL NAA+NON-PROBE: DETECTED
ERYTHROCYTE [DISTWIDTH] IN BLOOD BY AUTOMATED COUNT: 13.4 % (ref 12.3–15.4)
ETEC LTA+ST1A+ST1B TOX ST NAA+NON-PROBE: NOT DETECTED
G LAMBLIA DNA STL QL NAA+NON-PROBE: NOT DETECTED
GLOBULIN UR ELPH-MCNC: 2.7 GM/DL
GLUCOSE SERPL-MCNC: 125 MG/DL (ref 65–99)
GLUCOSE UR STRIP-MCNC: NEGATIVE MG/DL
HCT VFR BLD AUTO: 40 % (ref 34–46.6)
HGB BLD-MCNC: 13.3 G/DL (ref 12–15.9)
HGB UR QL STRIP.AUTO: NEGATIVE
IMM GRANULOCYTES # BLD AUTO: 0.03 10*3/MM3 (ref 0–0.05)
IMM GRANULOCYTES NFR BLD AUTO: 0.3 % (ref 0–0.5)
KETONES UR QL STRIP: ABNORMAL
LEUKOCYTE ESTERASE UR QL STRIP.AUTO: NEGATIVE
LIPASE SERPL-CCNC: 14 U/L (ref 13–60)
LYMPHOCYTES # BLD AUTO: 1.47 10*3/MM3 (ref 0.7–3.1)
LYMPHOCYTES NFR BLD AUTO: 12.5 % (ref 19.6–45.3)
MCH RBC QN AUTO: 28.1 PG (ref 26.6–33)
MCHC RBC AUTO-ENTMCNC: 33.3 G/DL (ref 31.5–35.7)
MCV RBC AUTO: 84.6 FL (ref 79–97)
MONOCYTES # BLD AUTO: 1.03 10*3/MM3 (ref 0.1–0.9)
MONOCYTES NFR BLD AUTO: 8.8 % (ref 5–12)
NEUTROPHILS NFR BLD AUTO: 78 % (ref 42.7–76)
NEUTROPHILS NFR BLD AUTO: 9.17 10*3/MM3 (ref 1.7–7)
NITRITE UR QL STRIP: NEGATIVE
NOROVIRUS GI+II RNA STL QL NAA+NON-PROBE: NOT DETECTED
NRBC BLD AUTO-RTO: 0 /100 WBC (ref 0–0.2)
P SHIGELLOIDES DNA STL QL NAA+NON-PROBE: NOT DETECTED
PH UR STRIP.AUTO: 5.5 [PH] (ref 5–8)
PLATELET # BLD AUTO: 178 10*3/MM3 (ref 140–450)
PMV BLD AUTO: 9.5 FL (ref 6–12)
POTASSIUM SERPL-SCNC: 4 MMOL/L (ref 3.5–5.2)
PROT SERPL-MCNC: 6.7 G/DL (ref 6–8.5)
PROT UR QL STRIP: NEGATIVE
RBC # BLD AUTO: 4.73 10*6/MM3 (ref 3.77–5.28)
RVA RNA STL QL NAA+NON-PROBE: NOT DETECTED
S ENT+BONG DNA STL QL NAA+NON-PROBE: NOT DETECTED
SAPO I+II+IV+V RNA STL QL NAA+NON-PROBE: NOT DETECTED
SHIGELLA SP+EIEC IPAH ST NAA+NON-PROBE: NOT DETECTED
SODIUM SERPL-SCNC: 135 MMOL/L (ref 136–145)
SP GR UR STRIP: 1.01 (ref 1–1.03)
UROBILINOGEN UR QL STRIP: ABNORMAL
V CHOL+PARA+VUL DNA STL QL NAA+NON-PROBE: NOT DETECTED
V CHOLERAE DNA STL QL NAA+NON-PROBE: NOT DETECTED
WBC NRBC COR # BLD AUTO: 11.74 10*3/MM3 (ref 3.4–10.8)
Y ENTEROCOL DNA STL QL NAA+NON-PROBE: NOT DETECTED

## 2025-07-08 PROCEDURE — 99285 EMERGENCY DEPT VISIT HI MDM: CPT

## 2025-07-08 PROCEDURE — 83690 ASSAY OF LIPASE: CPT | Performed by: EMERGENCY MEDICINE

## 2025-07-08 PROCEDURE — 85025 COMPLETE CBC W/AUTO DIFF WBC: CPT | Performed by: EMERGENCY MEDICINE

## 2025-07-08 PROCEDURE — 25510000001 IOPAMIDOL 61 % SOLUTION: Performed by: EMERGENCY MEDICINE

## 2025-07-08 PROCEDURE — 82565 ASSAY OF CREATININE: CPT

## 2025-07-08 PROCEDURE — 81003 URINALYSIS AUTO W/O SCOPE: CPT | Performed by: EMERGENCY MEDICINE

## 2025-07-08 PROCEDURE — 87507 IADNA-DNA/RNA PROBE TQ 12-25: CPT | Performed by: EMERGENCY MEDICINE

## 2025-07-08 PROCEDURE — 96374 THER/PROPH/DIAG INJ IV PUSH: CPT

## 2025-07-08 PROCEDURE — 96375 TX/PRO/DX INJ NEW DRUG ADDON: CPT

## 2025-07-08 PROCEDURE — 87493 C DIFF AMPLIFIED PROBE: CPT | Performed by: EMERGENCY MEDICINE

## 2025-07-08 PROCEDURE — 25010000002 ONDANSETRON PER 1 MG: Performed by: EMERGENCY MEDICINE

## 2025-07-08 PROCEDURE — 87449 NOS EACH ORGANISM AG IA: CPT | Performed by: EMERGENCY MEDICINE

## 2025-07-08 PROCEDURE — 80053 COMPREHEN METABOLIC PANEL: CPT | Performed by: EMERGENCY MEDICINE

## 2025-07-08 PROCEDURE — 25010000002 DROPERIDOL PER 5 MG: Performed by: EMERGENCY MEDICINE

## 2025-07-08 PROCEDURE — 83605 ASSAY OF LACTIC ACID: CPT | Performed by: EMERGENCY MEDICINE

## 2025-07-08 PROCEDURE — 25810000003 SODIUM CHLORIDE 0.9 % SOLUTION: Performed by: EMERGENCY MEDICINE

## 2025-07-08 PROCEDURE — 74177 CT ABD & PELVIS W/CONTRAST: CPT

## 2025-07-08 RX ORDER — IOPAMIDOL 612 MG/ML
85 INJECTION, SOLUTION INTRAVASCULAR
Status: COMPLETED | OUTPATIENT
Start: 2025-07-08 | End: 2025-07-08

## 2025-07-08 RX ORDER — ONDANSETRON 2 MG/ML
4 INJECTION INTRAMUSCULAR; INTRAVENOUS ONCE
Status: COMPLETED | OUTPATIENT
Start: 2025-07-08 | End: 2025-07-08

## 2025-07-08 RX ORDER — ONDANSETRON 4 MG/1
4 TABLET, FILM COATED ORAL EVERY 8 HOURS PRN
Qty: 15 TABLET | Refills: 0 | Status: SHIPPED | OUTPATIENT
Start: 2025-07-08

## 2025-07-08 RX ORDER — SODIUM CHLORIDE 0.9 % (FLUSH) 0.9 %
10 SYRINGE (ML) INJECTION AS NEEDED
Status: DISCONTINUED | OUTPATIENT
Start: 2025-07-08 | End: 2025-07-08 | Stop reason: HOSPADM

## 2025-07-08 RX ORDER — VANCOMYCIN HYDROCHLORIDE 125 MG/1
125 CAPSULE ORAL 4 TIMES DAILY
Qty: 40 CAPSULE | Refills: 0 | Status: SHIPPED | OUTPATIENT
Start: 2025-07-08 | End: 2025-07-18

## 2025-07-08 RX ORDER — DROPERIDOL 2.5 MG/ML
2.5 INJECTION, SOLUTION INTRAMUSCULAR; INTRAVENOUS ONCE
Status: COMPLETED | OUTPATIENT
Start: 2025-07-08 | End: 2025-07-08

## 2025-07-08 RX ORDER — DICYCLOMINE HCL 20 MG
20 TABLET ORAL EVERY 8 HOURS PRN
Qty: 20 TABLET | Refills: 0 | Status: SHIPPED | OUTPATIENT
Start: 2025-07-08

## 2025-07-08 RX ADMIN — IOPAMIDOL 85 ML: 612 INJECTION, SOLUTION INTRAVENOUS at 09:26

## 2025-07-08 RX ADMIN — ONDANSETRON 4 MG: 2 INJECTION, SOLUTION INTRAMUSCULAR; INTRAVENOUS at 07:29

## 2025-07-08 RX ADMIN — SODIUM CHLORIDE 1000 ML: 9 INJECTION, SOLUTION INTRAVENOUS at 07:29

## 2025-07-08 RX ADMIN — DROPERIDOL 2.5 MG: 2.5 INJECTION, SOLUTION INTRAMUSCULAR; INTRAVENOUS at 10:09

## 2025-07-08 NOTE — ED PROVIDER NOTES
"Subjective   History of Present Illness  44-year-old female presents for evaluation of abdominal pain and constipation.  Of note, prior to evaluating the patient I did a thorough review of her records.  She was seen here in the emergency department on  and was diagnosed with C. difficile colitis.  She was discharged home on oral vancomycin but return to the emergency department for worsening symptoms and was ultimately admitted from  through .  She was discharged and tells me that she stopped taking her oral vancomycin secondary to side effects of the medication.  She saw her primary care physician this past week for follow-up and was given \"pain medications\" for her ongoing diarrhea to \"help slow down her bowels.\"  For the past 4 days she has been experiencing significant constipation and accompanying abdominal pain/cramps refractory to over-the-counter stool softeners and enemas.  No fevers.  Given her ongoing symptoms, she was concerned and came here to the ED to be evaluated.  She currently rates her pain at 8 out of 10 in severity.      Review of Systems   Gastrointestinal:  Positive for abdominal pain and constipation.   All other systems reviewed and are negative.      Past Medical History:   Diagnosis Date   • Anxiety    • Asthma     as a child   • Back pain    • Bipolar disorder    • Depression    • Fatigue    • GERD (gastroesophageal reflux disease)     uncontrolled w/ BID PPI   • Head injury    • Insomnia    • Iron deficiency     prn IV iron, last infusion 2024   •  (normal spontaneous vaginal delivery)     x 3, no issues   • Peripheral edema    • Rectal bleeding     had C-scope.  from hemorrhoids   • Sleep apnea     suspected, never tested       Allergies   Allergen Reactions   • Lamotrigine Rash       Past Surgical History:   Procedure Laterality Date   • BLADDER SUSPENSION      w/ repair of rectal/uterine prolapse, no mesh   • COLONOSCOPY     • ENDOSCOPY N/A " 2017    Procedure: ESOPHAGOGASTRODUODENOSCOPY;  Surgeon: Rita Barnhart MD;  Location:  [facility];  Service:    • GASTRIC SLEEVE LAPAROSCOPIC N/A 2017    Procedure: GASTRIC SLEEVE LAPAROSCOPIC;  Surgeon: Rita Barnhart MD;  Location:  [facility];  Service:    • LAPAROSCOPIC APPENDECTOMY     • LAPAROSCOPIC CHOLECYSTECTOMY      no stones, suspected dysfunction   • LAPAROSCOPIC HYSTERECTOMY      benign dz - ovaries intact   • SINUS SURGERY  2012    X 2   • TUBAL ABDOMINAL LIGATION  2017       Family History   Problem Relation Age of Onset   • Hypertension Mother    • Cancer Father    • Hypertension Sister    • Hypertension Maternal Grandmother    • Hypertension Maternal Grandfather    • Hypertension Paternal Grandmother    • Heart disease Paternal Grandmother        Social History     Socioeconomic History   • Marital status:      Spouse name: Hector Platt   • Number of children: 3   • Years of education: College   Tobacco Use   • Smoking status: Former     Current packs/day: 0.00     Types: Cigarettes     Start date:      Quit date:      Years since quittin.5   • Smokeless tobacco: Never   Vaping Use   • Vaping status: Never Used   Substance and Sexual Activity   • Alcohol use: No   • Drug use: No   • Sexual activity: Defer     Birth control/protection: Surgical           Objective   Physical Exam  Vitals and nursing note reviewed.   Constitutional:       General: She is not in acute distress.     Appearance: She is well-developed. She is obese. She is not diaphoretic.      Comments: Nontoxic-appearing female   HENT:      Head: Normocephalic and atraumatic.   Cardiovascular:      Rate and Rhythm: Normal rate and regular rhythm.      Heart sounds: Normal heart sounds. No murmur heard.     No friction rub. No gallop.   Pulmonary:      Effort: Pulmonary effort is normal. No respiratory distress.      Breath sounds: Normal breath sounds. No wheezing or  "rales.   Abdominal:      General: Bowel sounds are normal. There is no distension.      Palpations: Abdomen is soft. There is no mass.      Tenderness: There is abdominal tenderness. There is no guarding or rebound.      Comments: Generalized abdominal tenderness present with no peritoneal signs, no pain out of proportion to exam   Genitourinary:     Comments: No CVA tenderness noted  Musculoskeletal:         General: Normal range of motion.   Skin:     General: Skin is warm and dry.      Findings: No erythema or rash.      Comments: No dermatomal rash present   Neurological:      Mental Status: She is alert and oriented to person, place, and time.   Psychiatric:         Mood and Affect: Mood normal.         Thought Content: Thought content normal.         Judgment: Judgment normal.         Procedures           ED Course  ED Course as of 07/08/25 1403   Tue Jul 08, 2025   0715 44-year-old female presents for evaluation of abdominal pain and constipation.  Of note, prior to evaluating the patient I did a thorough review of the patient's records.  She was seen here in the emergency department on June 20 and diagnosed with C. difficile colitis.  She was discharged home on oral vancomycin but returned to the emergency department for worsening symptoms and was ultimately admitted from June 23 through June 25.  She was discharged and stopped taking her oral vancomycin secondary to side effects of medication.  She saw her primary care physician this past week and was given \"pain medications\" for her ongoing diarrhea to \"help slow down her bowels.\"  For the past 4 days she has had significant constipation and abdominal pain/cramps refractory to over-the-counter stool softeners and enemas.  Given her ongoing symptoms, she returned here to the ED to be evaluated today.  She denies any fevers.  On arrival, the patient is nontoxic-appearing.  She has generalized abdominal tenderness without peritoneal signs or pain out of " proportion to exam.  No CVA tenderness noted.  No dermatomal rash present.  Differential diagnosis is quite broad.  We will obtain labs and imaging, and we will reassess following initial interventions. [DD]   0818 Labs interpreted independently by me remarkable for white blood cell count of 11,000 and are otherwise bland/unrevealing. [DD]   0933 C diff +. [DD]   1401 Gastrointestinal panel is also positive for enteropathogenic E. coli.  Normal renal function. [DD]   1401 Upon reevaluation, the patient looks and feels well.  I feel that her abdominal pain is most likely secondary to the pain medications that she has been taking.  There is no indication for an enema or emergent treatment of her constipation based on her CT findings.  I do feel that she needs to finish her outpatient course of oral vancomycin.  She will follow-up with her primary care physician within the next week.  Prescription for Bentyl and Zofran as needed as well.  Agreeable with plan given appropriate strict return precautions. [DD]      ED Course User Index  [DD] Lavelle Chen MD                                           Recent Results (from the past 24 hours)   Comprehensive Metabolic Panel    Collection Time: 07/08/25  7:21 AM    Specimen: Blood   Result Value Ref Range    Glucose 125 (H) 65 - 99 mg/dL    BUN 5.5 (L) 6.0 - 20.0 mg/dL    Creatinine 0.93 0.57 - 1.00 mg/dL    Sodium 135 (L) 136 - 145 mmol/L    Potassium 4.0 3.5 - 5.2 mmol/L    Chloride 99 98 - 107 mmol/L    CO2 26.0 22.0 - 29.0 mmol/L    Calcium 9.5 8.6 - 10.5 mg/dL    Total Protein 6.7 6.0 - 8.5 g/dL    Albumin 4.0 3.5 - 5.2 g/dL    ALT (SGPT) 19 1 - 33 U/L    AST (SGOT) 14 1 - 32 U/L    Alkaline Phosphatase 111 39 - 117 U/L    Total Bilirubin 0.5 0.0 - 1.2 mg/dL    Globulin 2.7 gm/dL    A/G Ratio 1.5 g/dL    BUN/Creatinine Ratio 5.9 (L) 7.0 - 25.0    Anion Gap 10.0 5.0 - 15.0 mmol/L    eGFR 77.9 >60.0 mL/min/1.73   Lipase    Collection Time: 07/08/25  7:21 AM     Specimen: Blood   Result Value Ref Range    Lipase 14 13 - 60 U/L   Urinalysis With Culture If Indicated - Urine, Clean Catch    Collection Time: 07/08/25  7:21 AM    Specimen: Urine, Clean Catch   Result Value Ref Range    Color, UA Yellow Yellow, Straw    Appearance, UA Clear Clear    pH, UA 5.5 5.0 - 8.0    Specific Gravity, UA 1.015 1.005 - 1.030    Glucose, UA Negative Negative    Ketones, UA Trace (A) Negative    Bilirubin, UA Negative Negative    Blood, UA Negative Negative    Protein, UA Negative Negative    Leuk Esterase, UA Negative Negative    Nitrite, UA Negative Negative    Urobilinogen, UA 0.2 E.U./dL 0.2 - 1.0 E.U./dL   Lactic Acid, Plasma    Collection Time: 07/08/25  7:21 AM    Specimen: Blood   Result Value Ref Range    Lactate 1.6 0.5 - 2.0 mmol/L   CBC Auto Differential    Collection Time: 07/08/25  7:21 AM    Specimen: Blood   Result Value Ref Range    WBC 11.74 (H) 3.40 - 10.80 10*3/mm3    RBC 4.73 3.77 - 5.28 10*6/mm3    Hemoglobin 13.3 12.0 - 15.9 g/dL    Hematocrit 40.0 34.0 - 46.6 %    MCV 84.6 79.0 - 97.0 fL    MCH 28.1 26.6 - 33.0 pg    MCHC 33.3 31.5 - 35.7 g/dL    RDW 13.4 12.3 - 15.4 %    RDW-SD 41.2 37.0 - 54.0 fl    MPV 9.5 6.0 - 12.0 fL    Platelets 178 140 - 450 10*3/mm3    Neutrophil % 78.0 (H) 42.7 - 76.0 %    Lymphocyte % 12.5 (L) 19.6 - 45.3 %    Monocyte % 8.8 5.0 - 12.0 %    Eosinophil % 0.1 (L) 0.3 - 6.2 %    Basophil % 0.3 0.0 - 1.5 %    Immature Grans % 0.3 0.0 - 0.5 %    Neutrophils, Absolute 9.17 (H) 1.70 - 7.00 10*3/mm3    Lymphocytes, Absolute 1.47 0.70 - 3.10 10*3/mm3    Monocytes, Absolute 1.03 (H) 0.10 - 0.90 10*3/mm3    Eosinophils, Absolute 0.01 0.00 - 0.40 10*3/mm3    Basophils, Absolute 0.03 0.00 - 0.20 10*3/mm3    Immature Grans, Absolute 0.03 0.00 - 0.05 10*3/mm3    nRBC 0.0 0.0 - 0.2 /100 WBC   Gastrointestinal Panel, PCR - Stool, Per Rectum    Collection Time: 07/08/25  7:21 AM    Specimen: Per Rectum; Stool   Result Value Ref Range    Campylobacter Not  Detected Not Detected    Plesiomonas shigelloides Not Detected Not Detected    Salmonella Not Detected Not Detected    Vibrio Not Detected Not Detected    Vibrio cholerae Not Detected Not Detected    Yersinia enterocolitica Not Detected Not Detected    Enteroaggregative E. coli (EAEC) Not Detected Not Detected    Enteropathogenic E. coli (EPEC) Detected (A) Not Detected    Enterotoxigenic E. coli (ETEC) lt/st Not Detected Not Detected    Shiga-like toxin-producing E. coli (STEC) stx1/stx2 Not Detected Not Detected    Shigella/Enteroinvasive E. coli (EIEC) Not Detected Not Detected    Cryptosporidium Not Detected Not Detected    Cyclospora cayetanensis Not Detected Not Detected    Entamoeba histolytica Not Detected Not Detected    Giardia lamblia Not Detected Not Detected    Adenovirus F40/41 Not Detected Not Detected    Astrovirus Not Detected Not Detected    Norovirus GI/GII Not Detected Not Detected    Rotavirus A Not Detected Not Detected    Sapovirus (I, II, IV or V) Not Detected Not Detected   Clostridioides difficile Toxin, PCR - Stool, Per Rectum    Collection Time: 07/08/25  7:21 AM    Specimen: Per Rectum; Stool   Result Value Ref Range    Toxigenic C. difficile by PCR Detected (A) Not Detected   Clostridioides difficile toxin Ag, Reflex - Stool, Per Rectum    Collection Time: 07/08/25  7:21 AM    Specimen: Per Rectum; Stool   Result Value Ref Range    C.diff Toxin Ag Positive (A) Negative   POC Creatinine    Collection Time: 07/08/25  7:29 AM    Specimen: Blood   Result Value Ref Range    Creatinine 0.90 0.60 - 1.30 mg/dL     Note: In addition to lab results from this visit, the labs listed above may include labs taken at another facility or during a different encounter within the last 24 hours. Please correlate lab times with ED admission and discharge times for further clarification of the services performed during this visit.    CT Abdomen Pelvis With Contrast   Final Result   Impression:      1. Shotty  small bowel mesenteric lymph nodes and retrocrural lymph nodes. Mild fat stranding the central small bowel mesentery. This can be an idiopathic, chronic and asymptomatic finding, or can be reactive due to active inflammation elsewhere in the    abdomen or pelvis. Differential includes early changes of lymphoma which is much less likely.      2. Decompressed appearance of the sigmoid. Questionable associated early changes of inflammation. Normal caliber normal-appearing colon elsewhere.      3. Fatty liver change, and spleen at upper limits of normal diameter.      4. Stable left lobe liver cyst. 2 very subtle liver lesions suspected to represent small hemangiomas unchanged from 6/20/2025 scan. Comparison with any previous available outside CT scans would be helpful to determine if these are stable over time.            Electronically Signed: Maverick Claudio MD     7/8/2025 9:52 AM EDT     Workstation ID: [id number]        Vitals:    07/08/25 0730 07/08/25 0800 07/08/25 1000 07/08/25 1030   BP:   133/86    BP Location:       Patient Position:       Pulse: 73 62 68 66   Resp:       Temp:       TempSrc:       SpO2: 95% 92% 98% 96%   Weight:       Height:         Medications   ondansetron (ZOFRAN) injection 4 mg (4 mg Intravenous Given 7/8/25 0729)   sodium chloride 0.9 % bolus 1,000 mL (0 mL Intravenous Stopped 7/8/25 0934)   iopamidol (ISOVUE-300) 61 % injection 85 mL (85 mL Intravenous Given 7/8/25 0926)   droperidol (INAPSINE) injection 2.5 mg (2.5 mg Intravenous Given 7/8/25 1009)     ECG/EMG Results (last 24 hours)       ** No results found for the last 24 hours. **          No orders to display                   Medical Decision Making  Problems Addressed:  C. difficile colitis: complicated acute illness or injury  Generalized abdominal pain: complicated acute illness or injury    Amount and/or Complexity of Data Reviewed  Labs: ordered.  Radiology: ordered.    Risk  Prescription drug management.        Final diagnoses:    C. difficile colitis   Generalized abdominal pain       ED Disposition  ED Disposition       ED Disposition   Discharge    Condition   Stable    Comment   --               Duy Baltazar MD  [address] [place] [address]  [phone]    In 1 week           Medication List        New Prescriptions      dicyclomine 20 MG tablet  Commonly known as: BENTYL  Take 1 tablet by mouth Every 8 (Eight) Hours As Needed for Abdominal Cramping.     ondansetron 4 MG tablet  Commonly known as: ZOFRAN  Take 1 tablet by mouth Every 8 (Eight) Hours As Needed for Nausea.     vancomycin 125 MG capsule  Commonly known as: VANCOCIN  Take 1 capsule by mouth 4 (Four) Times a Day for 10 days.               Where to Get Your Medications        These medications were sent to [facility] - [place] [address], [place] [address]      Hours: Monday to Friday 7 AM to 5:30 PM, Saturday & Sunday 8 AM to 4:30 PM Phone: [phone]   dicyclomine 20 MG tablet  ondansetron 4 MG tablet  vancomycin 125 MG capsule            Lavelle Chen MD  07/08/25 8991

## (undated) DEVICE — MEDI-VAC NON-CONDUCTIVE SUCTION TUBING: Brand: CARDINAL HEALTH

## (undated) DEVICE — GOWN,NON-REINFORCED,SIRUS,SET IN SLV,XL: Brand: MEDLINE

## (undated) DEVICE — SKIN AFFIX SURG ADHESIVE 72/CS 0.55ML: Brand: MEDLINE

## (undated) DEVICE — ENDOGATOR HYBRID TUBING KIT FOR USE WITH ENDOGATOR IRRIGATION PUMP, OLYMPUS PUMP, GI4000 ESU, AND TORRENT IRRIGATION PUMP.: Brand: ENDOGATOR KIT

## (undated) DEVICE — FLTR PLUMEPORT LAP W/CONN STRL

## (undated) DEVICE — 1 ML TUBERCULIN SYRINGE REGULAR TIP: Brand: MONOJECT

## (undated) DEVICE — APL DUPLOSPRAYER MIS 40CM

## (undated) DEVICE — ANTIBACTERIAL VIOLET BRAIDED (POLYGLACTIN 910), SYNTHETIC ABSORBABLE SUTURE: Brand: COATED VICRYL

## (undated) DEVICE — ECHELON FLEX POWERED PLUS LONG ARTICULATING ENDOSCOPIC LINEAR CUTTER, 60MM: Brand: ECHELON FLEX

## (undated) DEVICE — SYS CLS PORTSITE CT CLOSESURE 5AND10/12

## (undated) DEVICE — PK BARIATRIC 10

## (undated) DEVICE — ENDOPATH XCEL BLADELESS TROCARS WITH STABILITY SLEEVES: Brand: ENDOPATH XCEL

## (undated) DEVICE — ENDOPATH XCEL UNIVERSAL TROCAR STABLILITY SLEEVES: Brand: ENDOPATH XCEL

## (undated) DEVICE — ADAPT ST INFUS ADMIN SYR 70IN

## (undated) DEVICE — GLV SURG SENSICARE W/ALOE PF LF 7 STRL

## (undated) DEVICE — SUT MONOCRYL PLS ANTIB UND 3/0  PS1 27IN

## (undated) DEVICE — 50" SINGLE PATIENT USE HOVERMATT: Brand: SINGLE PATIENT USE HOVERMATT

## (undated) DEVICE — TROCAR: Brand: KII FIOS FIRST ENTRY

## (undated) DEVICE — SOL LR 1000ML

## (undated) DEVICE — APPL COTN TP PLSTC 6IN STRL LF PK/2

## (undated) DEVICE — GLV SURG DERMASSURE GRN LF PF 7.0

## (undated) DEVICE — Device: Brand: DEFENDO AIR/WATER/SUCTION AND BIOPSY VALVE

## (undated) DEVICE — CONTN GRAD MEAS TRIANG 32OZ BLK

## (undated) DEVICE — TISSUE RETRIEVAL SYSTEM: Brand: INZII RETRIEVAL SYSTEM

## (undated) DEVICE — AIRWY SZ11

## (undated) DEVICE — [HIGH FLOW INSUFFLATOR,  DO NOT USE IF PACKAGE IS DAMAGED,  KEEP DRY,  KEEP AWAY FROM SUNLIGHT,  PROTECT FROM HEAT AND RADIOACTIVE SOURCES.]: Brand: PNEUMOSURE

## (undated) DEVICE — ENDOSCOPY KIT: Brand: MEDLINE INDUSTRIES, INC.

## (undated) DEVICE — HARMONIC ACE +7 LAPAROSCOPIC SHEARS ADVANCED HEMOSTASIS 5MM DIAMETER 36CM SHAFT LENGTH  FOR USE WITH GRAY HAND PIECE ONLY: Brand: HARMONIC ACE

## (undated) DEVICE — GLV SURG SENSICARE MICRO PF LF 6.5 STRL

## (undated) DEVICE — CANNULA,OXY,ADULT,SUPERSOFT,W/7'TUB,UC: Brand: MEDLINE

## (undated) DEVICE — MEDI-VAC YANKAUER SUCTION HANDLE W/BULBOUS TIP: Brand: CARDINAL HEALTH